# Patient Record
Sex: FEMALE | Race: BLACK OR AFRICAN AMERICAN | Employment: UNEMPLOYED | URBAN - METROPOLITAN AREA
[De-identification: names, ages, dates, MRNs, and addresses within clinical notes are randomized per-mention and may not be internally consistent; named-entity substitution may affect disease eponyms.]

---

## 2017-01-17 ENCOUNTER — TELEPHONE (OUTPATIENT)
Dept: FAMILY MEDICINE | Facility: CLINIC | Age: 32
End: 2017-01-17

## 2017-01-17 NOTE — TELEPHONE ENCOUNTER
[facility] phone call message- general phone call:    Reason for call: Patient was scheduled to see Dr. Goodman for intake appt in July 2016, but missed that appt. She is wanting to reschedule. Please call when available. Thank you!    Return call needed: Yes    OK to leave a message on voice mail? Yes    Primary language: English      needed? No    Call taken on January 17, 2017 at 12:38 PM by Bob Angeles

## 2017-01-18 NOTE — TELEPHONE ENCOUNTER
FREDDIEM for patient to call back and ask for Alicia or Cassi to schedule new appointment with Dr. Goodman.

## 2017-02-24 ENCOUNTER — OFFICE VISIT (OUTPATIENT)
Dept: URGENT CARE | Facility: URGENT CARE | Age: 32
End: 2017-02-24
Payer: COMMERCIAL

## 2017-02-24 ENCOUNTER — TELEPHONE (OUTPATIENT)
Dept: FAMILY MEDICINE | Facility: CLINIC | Age: 32
End: 2017-02-24

## 2017-02-24 VITALS
HEART RATE: 78 BPM | TEMPERATURE: 98.8 F | SYSTOLIC BLOOD PRESSURE: 118 MMHG | HEIGHT: 65 IN | WEIGHT: 201 LBS | OXYGEN SATURATION: 97 % | DIASTOLIC BLOOD PRESSURE: 80 MMHG | BODY MASS INDEX: 33.49 KG/M2

## 2017-02-24 DIAGNOSIS — H69.91 DYSFUNCTION OF EUSTACHIAN TUBE, RIGHT: ICD-10-CM

## 2017-02-24 DIAGNOSIS — H66.001 ACUTE SUPPURATIVE OTITIS MEDIA OF RIGHT EAR WITHOUT SPONTANEOUS RUPTURE OF TYMPANIC MEMBRANE, RECURRENCE NOT SPECIFIED: Primary | ICD-10-CM

## 2017-02-24 PROCEDURE — 99213 OFFICE O/P EST LOW 20 MIN: CPT | Performed by: PHYSICIAN ASSISTANT

## 2017-02-24 RX ORDER — GUAIFENESIN AND PSEUDOEPHEDRINE HCL 1200; 120 MG/1; MG/1
1 TABLET, EXTENDED RELEASE ORAL EVERY 12 HOURS PRN
Qty: 20 TABLET | Refills: 0 | Status: SHIPPED | OUTPATIENT
Start: 2017-02-24 | End: 2017-07-27

## 2017-02-24 RX ORDER — AMOXICILLIN 875 MG
875 TABLET ORAL 2 TIMES DAILY
Qty: 20 TABLET | Refills: 0 | Status: SHIPPED | OUTPATIENT
Start: 2017-02-24 | End: 2017-03-06

## 2017-02-24 NOTE — TELEPHONE ENCOUNTER
[facility] phone call message- patient requesting a refill:    Full Medication Name: buPROPion (WELLBUTRIN XL) 150 MG 24 hr tablet        Pharmacy confirmed as:    [address] [address]  Phone: [phone] Fax: [phone] Alternate Fax: [phone], [phone]  : Yes    Additional Comments: None     OK to leave a message on voice mail? No    Primary language: English      needed? No    Call taken on February 24, 2017 at 2:25 PM by Cassi Chavez

## 2017-02-24 NOTE — PROGRESS NOTES
"SUBJECTIVE:   Vincent Burris is a 31 year old female presenting with a chief complaint of right ear pain, nasal congestion.  Onset of symptoms was 2 day(s) ago.  Course of illness is worsening.    Severity moderate  Current and Associated symptoms: nasal congestion  Treatment measures tried include OTC meds.  Predisposing factors include recent illness.    Past Medical History   Diagnosis Date     Hidradenitis suppurativa         Allergies   Allergen Reactions     Zithromax [Azithromycin] Hives     hives         Social History   Substance Use Topics     Smoking status: Former Smoker     Packs/day: 0.10     Years: 8.00     Types: Cigarettes     Quit date: 1/1/2011     Smokeless tobacco: Never Used     Alcohol use 3.0 oz/week     5 Standard drinks or equivalent per week      Comment: Drinks 3-5 drinks 1-2 nights per weekend       ROS:  CONSTITUTIONAL:NEGATIVE for fever, chills, change in weight  INTEGUMENTARY/SKIN: NEGATIVE for worrisome rashes, moles or lesions  ENT/MOUTH: POSITIVE for right ear pain, nasal congestion  RESP:NEGATIVE for significant cough or SOB  CV: NEGATIVE for chest pain, palpitations or peripheral edema  MUSCULOSKELETAL: NEGATIVE for significant arthralgias or myalgia  NEURO: NEGATIVE for weakness, dizziness or paresthesias    OBJECTIVE  :/80  Pulse 78  Temp 98.8  F (37.1  C) (Oral)  Ht 5' 5\" (1.651 m)  Wt 201 lb (91.2 kg)  SpO2 97%  BMI 33.45 kg/m2  GENERAL APPEARANCE: healthy, alert and no distress  EYES: EOMI,  PERRL, conjunctiva clear  HENT: TM erythematous right  NECK: supple, nontender, no lymphadenopathy  RESP: lungs clear to auscultation - no rales, rhonchi or wheezes  CV: regular rates and rhythm, normal S1 S2, no murmur noted  NEURO: Normal strength and tone, sensory exam grossly normal,  normal speech and mentation  SKIN: no suspicious lesions or rashes    ASSESSMENT/PLAN:      ICD-10-CM    1. Acute suppurative otitis media of right ear without spontaneous rupture of tympanic " membrane, recurrence not specified H66.001 amoxicillin (AMOXIL) 875 MG tablet   2. Dysfunction of eustachian tube, right H69.81 PseudoePHEDrine-guaiFENesin (MUCINEX D) 120-1200 MG TB12       Follow up as needed

## 2017-02-24 NOTE — NURSING NOTE
"Chief Complaint   Patient presents with     Otalgia     right ear ach, body aches, runny nose 3x days        Initial /80  Pulse 78  Temp 98.8  F (37.1  C) (Oral)  Ht 5' 5\" (1.651 m)  Wt 201 lb (91.2 kg)  SpO2 97%  BMI 33.45 kg/m2 Estimated body mass index is 33.45 kg/(m^2) as calculated from the following:    Height as of this encounter: 5' 5\" (1.651 m).    Weight as of this encounter: 201 lb (91.2 kg).  Medication Reconciliation: complete    "

## 2017-02-24 NOTE — LETTER
[facility]  [address] [address]  [phone]      February 24, 2017    RE:  Vincent Burris                                                                                                                                                       [address] [place] [id number]            To whom it may concern:    Vincent Burris was seen in the urgent care today for an illness.     Sincerely,        Gilmar Wolf [facility]

## 2017-02-24 NOTE — MR AVS SNAPSHOT
After Visit Summary   2/24/2017    Vincent Burris    MRN: [id number]           Patient Information     Date Of Birth          1985        Visit Information        Provider Department      2/24/2017 9:30 AM Gilmar Wolf PA-C [facility]        Today's Diagnoses     Acute suppurative otitis media of right ear without spontaneous rupture of tympanic membrane, recurrence not specified    -  1    Dysfunction of eustachian tube, right           Follow-ups after your visit        Who to contact     If you have questions or need follow up information about today's clinic visit or your schedule please contact [facility] directly at [phone].  Normal or non-critical lab and imaging results will be communicated to you by CloudSafehart, letter or phone within 4 business days after the clinic has received the results. If you do not hear from us within 7 days, please contact the clinic through CloudSafehart or phone. If you have a critical or abnormal lab result, we will notify you by phone as soon as possible.  Submit refill requests through Ridley or call your pharmacy and they will forward the refill request to us. Please allow 3 business days for your refill to be completed.          Additional Information About Your Visit        MyChart Information     Ridley gives you secure access to your electronic health record. If you see a primary care provider, you can also send messages to your care team and make appointments. If you have questions, please call your primary care clinic.  If you do not have a primary care provider, please call [phone] and they will assist you.        Care EveryWhere ID     This is your Care EveryWhere ID. This could be used by other organizations to access your [place] medical records  [id number]        Your Vitals Were     Pulse Temperature Height Pulse Oximetry BMI (Body Mass Index)       78 98.8  F (37.1  C)  "(Oral) 5' 5\" (1.651 m) 97% 33.45 kg/m2        Blood Pressure from Last 3 Encounters:   02/24/17 118/80   07/20/16 123/81   06/15/16 139/85    Weight from Last 3 Encounters:   02/24/17 201 lb (91.2 kg)   07/20/16 186 lb (84.4 kg)   06/15/16 185 lb 12.8 oz (84.3 kg)              Today, you had the following     No orders found for display         Today's Medication Changes          These changes are accurate as of: 2/24/17  9:54 AM.  If you have any questions, ask your nurse or doctor.               Start taking these medicines.        Dose/Directions    amoxicillin 875 MG tablet   Commonly known as:  AMOXIL   Used for:  Acute suppurative otitis media of right ear without spontaneous rupture of tympanic membrane, recurrence not specified   Started by:  Gilmar Wolf PA-C        Dose:  875 mg   Take 1 tablet (875 mg) by mouth 2 times daily for 10 days   Quantity:  20 tablet   Refills:  0       PseudoePHEDrine-guaiFENesin 120-1200 MG Tb12   Commonly known as:  MUCINEX D   Used for:  Dysfunction of eustachian tube, right   Started by:  Gilmar Wolf PA-C        Dose:  1 tablet   Take 1 tablet by mouth every 12 hours as needed for congestion   Quantity:  20 tablet   Refills:  0            Where to get your medicines      These medications were sent to [address], [facility] [address]     Phone:  [phone]     amoxicillin 875 MG tablet         Some of these will need a paper prescription and others can be bought over the counter.  Ask your nurse if you have questions.     Bring a paper prescription for each of these medications     PseudoePHEDrine-guaiFENesin 120-1200 MG Tb12                Primary Care Provider Office Phone # Fax #    Xiomara Smith [phone]       [address]  [facility] [address]        Thank you!     Thank you for choosing [facility]" for your care. Our goal is always to provide you with excellent care. Hearing back from our patients is one way we can continue to improve our services. Please take a few minutes to complete the written survey that you may receive in the mail after your visit with us. Thank you!             Your Updated Medication List - Protect others around you: Learn how to safely use, store and throw away your medicines at www.disposemymeds.org.          This list is accurate as of: 2/24/17  9:54 AM.  Always use your most recent med list.                   Brand Name Dispense Instructions for use    amoxicillin 875 MG tablet    AMOXIL    20 tablet    Take 1 tablet (875 mg) by mouth 2 times daily for 10 days       buPROPion 150 MG 24 hr tablet    WELLBUTRIN XL    30 tablet    Take 1 tablet (150 mg) by mouth every morning       cephALEXin 500 MG capsule    KEFLEX    90 capsule    Take 1 capsule (500 mg) by mouth 3 times daily       cetirizine 10 MG tablet    zyrTEC    30 tablet    Take 1 tablet (10 mg) by mouth daily       clindamycin 1 % lotion    CLEOCIN T    60 mL    Daily to underarms       fluticasone 50 MCG/ACT spray    FLONASE    1 Bottle    Spray 1 spray into both nostrils daily       metroNIDAZOLE 0.75 % topical gel    METROGEL    45 g    Apply to both axilla once or twice daily as tolerated.       PseudoePHEDrine-guaiFENesin 120-1200 MG Tb12    MUCINEX D    20 tablet    Take 1 tablet by mouth every 12 hours as needed for congestion       sertraline 100 MG tablet    ZOLOFT    90 tablet    Take 1 tablet (100 mg) by mouth daily

## 2017-02-27 DIAGNOSIS — F41.1 GAD (GENERALIZED ANXIETY DISORDER): ICD-10-CM

## 2017-02-27 NOTE — TELEPHONE ENCOUNTER
Date of last visit at clinic: 07/20/2016    Please complete refill and CLOSE ENCOUNTER.  Closing the encounter signifies the refill is complete.

## 2017-03-01 NOTE — TELEPHONE ENCOUNTER
Patient returned call to schedule [facility] Intake. Patient would like to schedule with Dr. Goodman in April, currently schedule is not released that far. Informed patient I would add them to the recall list and we would call once April's schedule becomes available.

## 2017-03-03 RX ORDER — BUPROPION HYDROCHLORIDE 150 MG/1
150 TABLET ORAL EVERY MORNING
Qty: 30 TABLET | Refills: 3 | Status: SHIPPED | OUTPATIENT
Start: 2017-03-03 | End: 2017-08-28

## 2017-03-06 NOTE — TELEPHONE ENCOUNTER
Patient scheduled 04/10/17 at 4 pm with Dr. Goodman.    Cassi Chavez, [facility]

## 2017-04-10 ENCOUNTER — OFFICE VISIT (OUTPATIENT)
Dept: PSYCHOLOGY | Facility: CLINIC | Age: 32
End: 2017-04-10

## 2017-04-10 DIAGNOSIS — F41.1 GAD (GENERALIZED ANXIETY DISORDER): Primary | ICD-10-CM

## 2017-04-10 ASSESSMENT — PATIENT HEALTH QUESTIONNAIRE - PHQ9: 5. POOR APPETITE OR OVEREATING: SEVERAL DAYS

## 2017-04-10 ASSESSMENT — ANXIETY QUESTIONNAIRES
3. WORRYING TOO MUCH ABOUT DIFFERENT THINGS: SEVERAL DAYS
7. FEELING AFRAID AS IF SOMETHING AWFUL MIGHT HAPPEN: NOT AT ALL
5. BEING SO RESTLESS THAT IT IS HARD TO SIT STILL: NOT AT ALL
GAD7 TOTAL SCORE: 6
1. FEELING NERVOUS, ANXIOUS, OR ON EDGE: SEVERAL DAYS
2. NOT BEING ABLE TO STOP OR CONTROL WORRYING: SEVERAL DAYS
IF YOU CHECKED OFF ANY PROBLEMS ON THIS QUESTIONNAIRE, HOW DIFFICULT HAVE THESE PROBLEMS MADE IT FOR YOU TO DO YOUR WORK, TAKE CARE OF THINGS AT HOME, OR GET ALONG WITH OTHER PEOPLE: SOMEWHAT DIFFICULT
6. BECOMING EASILY ANNOYED OR IRRITABLE: MORE THAN HALF THE DAYS
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
## 2017-04-10 NOTE — MR AVS SNAPSHOT
After Visit Summary   4/10/2017    Vincent Burris    MRN: [id number]           Patient Information     Date Of Birth          1985        Visit Information        Provider Department      4/10/2017 4:00 PM Alessandra Goodman, PhD [facility]        Today's Diagnoses     SOLANGE (generalized anxiety disorder)    -  1       Follow-ups after your visit        Follow-up notes from your care team     Return in about 2 weeks (around 4/24/2017).      Your next 10 appointments already scheduled     May 02, 2017  8:20 AM CDT   Return Visit with Alessandra Goodman,    [facility] ([facility])    [address],  [address]  [place] [address]   [phone]              Who to contact     Please call your clinic at [phone] to:    Ask questions about your health    Make or cancel appointments    Discuss your medicines    Learn about your test results    Speak to your doctor   If you have compliments or concerns about an experience at your clinic, or if you wish to file a complaint, please contact [facility] Patient Relations at [phone] or email us at [email or web address]         Additional Information About Your Visit        MyChart Information     [employer] gives you secure access to your electronic health record. If you see a primary care provider, you can also send messages to your care team and make appointments. If you have questions, please call your primary care clinic.  If you do not have a primary care provider, please call [phone] and they will assist you.      [employer] is an electronic gateway that provides easy, online access to your medical records. With [employer], you can request a clinic appointment, read your test results, renew a prescription or communicate with your care team.     To access your existing account, please contact your [facility] or call  [phone] for assistance.        Care EveryWhere ID     This is your Care EveryWhere ID. This could be used by other organizations to access your [facility] medical records  [id number]         Blood Pressure from Last 3 Encounters:   02/24/17 118/80   07/20/16 123/81   06/15/16 139/85    Weight from Last 3 Encounters:   02/24/17 201 lb (91.2 kg)   07/20/16 186 lb (84.4 kg)   06/15/16 185 lb 12.8 oz (84.3 kg)              Today, you had the following     No orders found for display       Primary Care Provider Office Phone # Fax #    Xiomara Mariela Smith [phone]       [address]  [facility] [address]        Thank you!     Thank you for choosing [facility]  for your care. Our goal is always to provide you with excellent care. Hearing back from our patients is one way we can continue to improve our services. Please take a few minutes to complete the written survey that you may receive in the mail after your visit with us. Thank you!             Your Updated Medication List - Protect others around you: Learn how to safely use, store and throw away your medicines at www.disposemymeds.org.          This list is accurate as of: 4/10/17 11:59 PM.  Always use your most recent med list.                   Brand Name Dispense Instructions for use    buPROPion 150 MG 24 hr tablet    WELLBUTRIN XL    30 tablet    Take 1 tablet (150 mg) by mouth every morning       cephALEXin 500 MG capsule    KEFLEX    90 capsule    Take 1 capsule (500 mg) by mouth 3 times daily       cetirizine 10 MG tablet    zyrTEC    30 tablet    Take 1 tablet (10 mg) by mouth daily       clindamycin 1 % lotion    CLEOCIN T    60 mL    Daily to underarms       fluticasone 50 MCG/ACT spray    FLONASE    1 Bottle    Spray 1 spray into both nostrils daily       metroNIDAZOLE 0.75 % topical gel    METROGEL    45 g    Apply to both axilla once or twice daily as tolerated.        PseudoePHEDrine-guaiFENesin 120-1200 MG Tb12    MUCINEX D    20 tablet    Take 1 tablet by mouth every 12 hours as needed for congestion       sertraline 100 MG tablet    ZOLOFT    90 tablet    Take 1 tablet (100 mg) by mouth daily

## 2017-04-11 ASSESSMENT — ANXIETY QUESTIONNAIRES: GAD7 TOTAL SCORE: 6

## 2017-04-11 ASSESSMENT — PATIENT HEALTH QUESTIONNAIRE - PHQ9: SUM OF ALL RESPONSES TO PHQ QUESTIONS 1-9: 6

## 2017-04-12 NOTE — PROGRESS NOTES
Preceptor Attestation:  I have reviewed and agree with the behavioral health fellow's documentation for this visit.  I did not see the patient.  Supervising Clinical Psychologist:  Delisa Encinas PSYD LP

## 2017-06-19 DIAGNOSIS — F41.1 GAD (GENERALIZED ANXIETY DISORDER): ICD-10-CM

## 2017-06-19 RX ORDER — SERTRALINE HYDROCHLORIDE 100 MG/1
100 TABLET, FILM COATED ORAL DAILY
Qty: 90 TABLET | Refills: 3 | Status: SHIPPED | OUTPATIENT
Start: 2017-06-19

## 2017-06-19 NOTE — TELEPHONE ENCOUNTER
Request for medication refill:    Date of last visit at clinic: 7/20/16    Please complete refill if appropriate and CLOSE ENCOUNTER.    Closing the encounter signifies the refill is complete.    If refill has been denied, please complete the smart phrase .smirefuse and route it to the [facility] RN TRIAGE pool to inform the patient and the pharmacy.    Pam Romero

## 2017-06-30 ENCOUNTER — OFFICE VISIT (OUTPATIENT)
Dept: URGENT CARE | Facility: URGENT CARE | Age: 32
End: 2017-06-30
Payer: COMMERCIAL

## 2017-06-30 VITALS
TEMPERATURE: 98.3 F | SYSTOLIC BLOOD PRESSURE: 116 MMHG | HEART RATE: 72 BPM | DIASTOLIC BLOOD PRESSURE: 78 MMHG | BODY MASS INDEX: 31.28 KG/M2 | OXYGEN SATURATION: 99 % | WEIGHT: 188 LBS

## 2017-06-30 DIAGNOSIS — K64.4 EXTERNAL HEMORRHOIDS: Primary | ICD-10-CM

## 2017-06-30 PROCEDURE — 99213 OFFICE O/P EST LOW 20 MIN: CPT | Performed by: FAMILY MEDICINE

## 2017-06-30 NOTE — PROGRESS NOTES
SUBJECTIVE:   Vincent Burris is a 31 year old female presenting with a chief complaint of a possible genital herpes outbreak.  She has noticed a little itching, discomfort and a spongy mass around her anus.  No blood with defecation.  No pain with defecation.  She was constipated prior to these coming up this week, and has taken a laxative.   She has had herpes outbreaks twice in the past.   Both times stinging/painful rash to groin.  None of these symptoms this time.  No fevers.      ROS:  5 point review of symptoms negative other than positives stated above.    OBJECTIVE  /78  Pulse 72  Temp 98.3  F (36.8  C) (Oral)  Wt 188 lb (85.3 kg)  SpO2 99%  BMI 31.28 kg/m2  GENERAL:  Awake, alert and interactive. No acute distress.  :  Anus - 2 external hemorrhoids noted, no bleeding.  Neither is thrombosed.  Minimally TTP.   External genitalia otherwise normal in appearance, no rashes or lesions.    ASSESSMENT/PLAN    ICD-10-CM    1. External hemorrhoids K64.4      We discussed the expected course and symptomatic cares in detail, including return to care if symptoms not improving as expected, do not resolve completely, or if any new or worsening symptoms develop.    Patient Instructions     Preparation H or similar product.   Consider a stool softener.          Hemorrhoids    Hemorrhoids are swollen and inflamed veins inside the rectum and near the anus. The rectum is the last several inches of the colon. The anus is the passage between the rectum and the outside of the body.  Causes  The veins can become swollen due to increased pressure in them. This is most often caused by:    Chronic constipation or diarrhea    Straining when having a bowel movement    Sitting too long on the toilet    A low-fiber diet    Pregnancy  Symptoms    Bleeding from the rectum (this may be noticeable after bowel movements)    Lump near the anus    Itching around the anus    Pain around the anus  There are different types of  hemorrhoids. Depending on the type you have and the severity, you may be able to treat yourself at home. In some cases, a procedure may be the best treatment option. Your healthcare provider can tell you more about this, if needed.  Home care  General care    To get relief from pain or itching, try:    Topical products. Your healthcare provider may prescribe or recommend creams, ointments, or pads that can be applied to the hemorrhoid. Use these exactly as directed.    Medicines. Your healthcare provider may recommend stool softeners, suppositories, or laxatives to help manage constipation. Use these exactly as directed.    Sitz baths. A sitz bath involves sitting in a few inches of warm bath water. Be careful not to make the water so hot that you burn yourself--test it before sitting in it. Soak for about 10 to 15 minutes a few times a day. This may help relieve pain.  Tips to help prevent hemorrhoids    Eat more fiber. Fiber adds bulk to stool and absorbs water as it moves through your colon. This makes stool softer and easier to pass.    Increase the fiber in your diet with more fiber-rich foods. These include fresh fruit, vegetables, and whole grains.    Take a fiber supplement or bulking agent, if advised to by your provider. These include products such as psyllium or methylcellulose.    Drink plenty of water, if directed to by your provider. This can help keep stool soft.    Be more active. Frequent exercise aids digestion and helps prevent constipation. It may also help make bowel movements more regular.    Don t strain during bowel movements. This can make hemorrhoids more likely. Also, don t sit on the toilet for long periods of time.  Follow-up care  Follow up with your healthcare provider, or as advised. If a culture or imaging tests were done, you will be notified of the results when they are ready. This may take a few days or longer.  When to seek medical advice  Call your healthcare provider right away  if any of these occur:    Increased bleeding from the rectum    Increased pain around the rectum or anus    Weakness or dizziness  Call 911  Call 911 or return to the emergency department right away if any of these occur:    Trouble breathing or swallowing    Fainting or loss of consciousness    Unusually fast heart rate    Vomiting blood    Large amounts of blood in stool  Date Last Reviewed: 6/22/2015 2000-2017 [employer]. [address] [place] [address], [place] [id number]. All rights reserved. This information is not intended as a substitute for professional medical care. Always follow your healthcare professional's instructions.        Treating Hemorrhoids: Self-Care    Follow your healthcare provider s advice about caring for your hemorrhoids at home. Some treatments help relieve symptoms right away. Others involve making changes in your diet and exercise habits. These can help ease constipation and prevent hemorrhoid symptoms from coming back.  Relieving symptoms  Your healthcare provider may prescribe anti-inflammatory medicine to help ease your symptoms. The following tips will also help relieve pain and swelling.    Take sitz baths. Taking a sitz bath means sitting in a few inches of warm bath water. Soaking for 10 minutes twice a day can provide welcome relief from painful hemorrhoids. It can also help the area stay clean.    Develop good bowel habits. Use the bathroom when you need to. Don t ignore the urge to move your bowels. This can lead to constipation, hard stools, and straining. Also, don t read while on the toilet. Sit only as long as needed. Wipe gently with soft, unscented toilet tissue or baby wipes.    Use ice packs. Placing an ice pack on a thrombosed external hemorrhoid can help relieve pain right away. It will also help reduce the blood clot. Use the ice for 15 to 20 minutes at a time. Keep a cloth between the ice and your skin to prevent skin damage.    Use other  measures. Laxatives and enemas can help ease constipation. But use them only on your healthcare provider s advice. For symptom relief, try using cotton pads soaked in witch hazel. These are available at most drugstores. Over-the-counter hemorrhoid ointments and petroleum jelly can also provide relief.  Add fiber to your diet  Adding fiber to your diet can help relieve constipation by making stools softer and easier to pass. To increase your fiber intake, your healthcare provider may recommend a bulking agent, such as psyllium. This is a high-fiber supplement available at most grocery stores and drugstores. Eating more fiber-rich foods will also help. There are two types of fiber:    Insoluble fiber is the main ingredient in bulking agents. It s also found in foods such as wheat bran, whole-grain breads, fresh fruits, and vegetables.    Soluble fiber is found in foods such as oat bran. Although soluble fiber is good for you, it may not ease constipation as much as foods high in insoluble fiber.  Drink more water  Along with a high-fiber diet, drinking more water can help ease constipation. This is because insoluble fiber absorbs water, making stools soft and bulky. Be sure to drink plenty of water throughout the day. Drinking fruit juices, such as prune juice or apple juice, can also help prevent constipation.  Get more exercise  Regular exercise aids digestion and helps prevent constipation. It s also great for your health. So talk with your healthcare provider about starting an exercise program. Low-impact activities, such as swimming or walking, are good places to start. Take it easy at first. And remember to drink plenty of water when you exercise.  High-fiber foods  High-fiber foods offer many benefits. By making your stools softer, they help heal and prevent swollen hemorrhoids. They may also help reduce the risk of colon and rectal cancer. Best of all, they re usually low in calories and taste great. Here are  some examples of fiber-rich foods.    Whole grains, such as wheat bran, corn bran, and brown rice.    Vegetables, especially carrots, broccoli, cabbage, and peas.    Fruits, such as apples, bananas, raisins, peaches, and pears.    Nuts and legumes, especially peanuts, lentils, and kidney beans.  Easy ways to add fiber  The tips below offer some simple ways to add more high-fiber foods to your meals.    Start your day with a high-fiber breakfast. Eat a wheat bran cereal along with a sliced banana. Or, try peanut butter on whole-wheat toast.    Eat carrot sticks for snacks. They re easy to prepare, taste great, and are low in calories.    Use whole-grain breads instead of white bread for sandwiches.    Eat fruits for treats. Try an apple and some raisins instead of a candy bar.   Date Last Reviewed: 7/1/2016 2000-2017 [employer]. [address] [place] [address]. All rights reserved. This information is not intended as a substitute for professional medical care. Always follow your healthcare professional's instructions.        Understanding Hemorrhoids    Hemorrhoid tissues are  cushions  of blood vessels that swell slightly during bowel movements. Too much pressure on the anal canal can make these tissues remain enlarged, become inflamed, and cause symptoms. This can happen both inside and outside the anal canal.  Parts of the anal canal  The parts of the anal canal are:    Internal hemorrhoid tissue is in the upper area of the anal canal.    The rectum is the last several inches of the colon. This is where stool is stored prior to bowel movements.    Anal sphincters are ring-shaped muscles that expand and contract to control the anal opening.    External hemorrhoid tissue lies under the anal skin.    The anus is the passage between the rectum and the outside of the body.  Normal hemorrhoid tissue  Hemorrhoid tissues play an important role in helping your body eliminate waste. Food passes  from the stomach through the intestines. The waste (stool) then travels through the colon to the rectum. It is stored in the rectum until it s ready to be passed from the anus. During bowel movements, hemorrhoids swell with blood and become slightly larger. This swelling helps protect and cushion the anal canal as stool passes from the body. Once the stool has passed, the tissues stop swelling and return to normal.  Problem hemorrhoids  Pressure due to straining or other factors can cause hemorrhoid tissues to remain swollen. When this happens to the hemorrhoid tissues in the anal canal they re called internal hemorrhoids. Swollen tissues around the anal opening are called external hemorrhoids. Depending on the location, your symptoms can differ.    Internal hemorrhoids often happen in clusters around the wall of the anal canal. They are usually painless. But they may prolapse (protrude out of the anus) due to straining or pressure from hard stool. After the bowel movement is over, they may then reduce (return inside the body). Internal hemorrhoids often bleed. They can also discharge mucus.      External hemorrhoids are located at the anal opening, just beneath the skin. These tissues rarely cause problems unless they thrombose (form a blood clot). When this happens, a hard, bluish lump may appear. A thrombosed hemorrhoid also causes sudden, severe pain. In time, the clot may go away on its own. This sometimes leaves a  skin tag  of tissue stretched by the clot.  Hemorrhoid symptoms  Hemorrhoid symptoms may include:    Pain or a burning sensation    Bleeding during bowel movements    Protrusion of tissue from the anus    Itching around the anus  Causes of hemorrhoids  There s no single cause of hemorrhoids. Most often, though, they are caused by too much pressure on the anal canal. This can be due to:    Chronic (ongoing) constipation    Straining during bowel movements    Sitting too long on the  toilet    Strenuous exercise or heavy lifting    Pregnancy and childbirth    Aging    Diarrhea  Date Last Reviewed: 7/1/2016 2000-2017 [employer]. [address] [place] [address], [place] [address]. All rights reserved. This information is not intended as a substitute for professional medical care. Always follow your healthcare professional's instructions.

## 2017-06-30 NOTE — MR AVS SNAPSHOT
After Visit Summary   6/30/2017    Vincent Burris    MRN: [id number]           Patient Information     Date Of Birth          1985        Visit Information        Provider Department      6/30/2017 6:15 PM Delisa Rios DO [facility]        Today's Diagnoses     External hemorrhoids    -  1      Care Instructions    Preparation H or similar product.   Consider a stool softener.          Hemorrhoids    Hemorrhoids are swollen and inflamed veins inside the rectum and near the anus. The rectum is the last several inches of the colon. The anus is the passage between the rectum and the outside of the body.  Causes  The veins can become swollen due to increased pressure in them. This is most often caused by:    Chronic constipation or diarrhea    Straining when having a bowel movement    Sitting too long on the toilet    A low-fiber diet    Pregnancy  Symptoms    Bleeding from the rectum (this may be noticeable after bowel movements)    Lump near the anus    Itching around the anus    Pain around the anus  There are different types of hemorrhoids. Depending on the type you have and the severity, you may be able to treat yourself at home. In some cases, a procedure may be the best treatment option. Your healthcare provider can tell you more about this, if needed.  Home care  General care    To get relief from pain or itching, try:    Topical products. Your healthcare provider may prescribe or recommend creams, ointments, or pads that can be applied to the hemorrhoid. Use these exactly as directed.    Medicines. Your healthcare provider may recommend stool softeners, suppositories, or laxatives to help manage constipation. Use these exactly as directed.    Sitz baths. A sitz bath involves sitting in a few inches of warm bath water. Be careful not to make the water so hot that you burn yourself--test it before sitting in it. Soak for about 10 to 15 minutes a few times a  day. This may help relieve pain.  Tips to help prevent hemorrhoids    Eat more fiber. Fiber adds bulk to stool and absorbs water as it moves through your colon. This makes stool softer and easier to pass.    Increase the fiber in your diet with more fiber-rich foods. These include fresh fruit, vegetables, and whole grains.    Take a fiber supplement or bulking agent, if advised to by your provider. These include products such as psyllium or methylcellulose.    Drink plenty of water, if directed to by your provider. This can help keep stool soft.    Be more active. Frequent exercise aids digestion and helps prevent constipation. It may also help make bowel movements more regular.    Don t strain during bowel movements. This can make hemorrhoids more likely. Also, don t sit on the toilet for long periods of time.  Follow-up care  Follow up with your healthcare provider, or as advised. If a culture or imaging tests were done, you will be notified of the results when they are ready. This may take a few days or longer.  When to seek medical advice  Call your healthcare provider right away if any of these occur:    Increased bleeding from the rectum    Increased pain around the rectum or anus    Weakness or dizziness  Call 911  Call 911 or return to the emergency department right away if any of these occur:    Trouble breathing or swallowing    Fainting or loss of consciousness    Unusually fast heart rate    Vomiting blood    Large amounts of blood in stool  Date Last Reviewed: 6/22/2015 2000-2017 [employer]. [address] [place] [address], [place] [address]. All rights reserved. This information is not intended as a substitute for professional medical care. Always follow your healthcare professional's instructions.        Treating Hemorrhoids: Self-Care    Follow your healthcare provider s advice about caring for your hemorrhoids at home. Some treatments help relieve symptoms right away. Others involve making  changes in your diet and exercise habits. These can help ease constipation and prevent hemorrhoid symptoms from coming back.  Relieving symptoms  Your healthcare provider may prescribe anti-inflammatory medicine to help ease your symptoms. The following tips will also help relieve pain and swelling.    Take sitz baths. Taking a sitz bath means sitting in a few inches of warm bath water. Soaking for 10 minutes twice a day can provide welcome relief from painful hemorrhoids. It can also help the area stay clean.    Develop good bowel habits. Use the bathroom when you need to. Don t ignore the urge to move your bowels. This can lead to constipation, hard stools, and straining. Also, don t read while on the toilet. Sit only as long as needed. Wipe gently with soft, unscented toilet tissue or baby wipes.    Use ice packs. Placing an ice pack on a thrombosed external hemorrhoid can help relieve pain right away. It will also help reduce the blood clot. Use the ice for 15 to 20 minutes at a time. Keep a cloth between the ice and your skin to prevent skin damage.    Use other measures. Laxatives and enemas can help ease constipation. But use them only on your healthcare provider s advice. For symptom relief, try using cotton pads soaked in witch hazel. These are available at most drugstores. Over-the-counter hemorrhoid ointments and petroleum jelly can also provide relief.  Add fiber to your diet  Adding fiber to your diet can help relieve constipation by making stools softer and easier to pass. To increase your fiber intake, your healthcare provider may recommend a bulking agent, such as psyllium. This is a high-fiber supplement available at most grocery stores and drugstores. Eating more fiber-rich foods will also help. There are two types of fiber:    Insoluble fiber is the main ingredient in bulking agents. It s also found in foods such as wheat bran, whole-grain breads, fresh fruits, and vegetables.    Soluble fiber is  found in foods such as oat bran. Although soluble fiber is good for you, it may not ease constipation as much as foods high in insoluble fiber.  Drink more water  Along with a high-fiber diet, drinking more water can help ease constipation. This is because insoluble fiber absorbs water, making stools soft and bulky. Be sure to drink plenty of water throughout the day. Drinking fruit juices, such as prune juice or apple juice, can also help prevent constipation.  Get more exercise  Regular exercise aids digestion and helps prevent constipation. It s also great for your health. So talk with your healthcare provider about starting an exercise program. Low-impact activities, such as swimming or walking, are good places to start. Take it easy at first. And remember to drink plenty of water when you exercise.  High-fiber foods  High-fiber foods offer many benefits. By making your stools softer, they help heal and prevent swollen hemorrhoids. They may also help reduce the risk of colon and rectal cancer. Best of all, they re usually low in calories and taste great. Here are some examples of fiber-rich foods.    Whole grains, such as wheat bran, corn bran, and brown rice.    Vegetables, especially carrots, broccoli, cabbage, and peas.    Fruits, such as apples, bananas, raisins, peaches, and pears.    Nuts and legumes, especially peanuts, lentils, and kidney beans.  Easy ways to add fiber  The tips below offer some simple ways to add more high-fiber foods to your meals.    Start your day with a high-fiber breakfast. Eat a wheat bran cereal along with a sliced banana. Or, try peanut butter on whole-wheat toast.    Eat carrot sticks for snacks. They re easy to prepare, taste great, and are low in calories.    Use whole-grain breads instead of white bread for sandwiches.    Eat fruits for treats. Try an apple and some raisins instead of a candy bar.   Date Last Reviewed: 7/1/2016 2000-2017 [employer] [address]  [place] [address]. All rights reserved. This information is not intended as a substitute for professional medical care. Always follow your healthcare professional's instructions.        Understanding Hemorrhoids    Hemorrhoid tissues are  cushions  of blood vessels that swell slightly during bowel movements. Too much pressure on the anal canal can make these tissues remain enlarged, become inflamed, and cause symptoms. This can happen both inside and outside the anal canal.  Parts of the anal canal  The parts of the anal canal are:    Internal hemorrhoid tissue is in the upper area of the anal canal.    The rectum is the last several inches of the colon. This is where stool is stored prior to bowel movements.    Anal sphincters are ring-shaped muscles that expand and contract to control the anal opening.    External hemorrhoid tissue lies under the anal skin.    The anus is the passage between the rectum and the outside of the body.  Normal hemorrhoid tissue  Hemorrhoid tissues play an important role in helping your body eliminate waste. Food passes from the stomach through the intestines. The waste (stool) then travels through the colon to the rectum. It is stored in the rectum until it s ready to be passed from the anus. During bowel movements, hemorrhoids swell with blood and become slightly larger. This swelling helps protect and cushion the anal canal as stool passes from the body. Once the stool has passed, the tissues stop swelling and return to normal.  Problem hemorrhoids  Pressure due to straining or other factors can cause hemorrhoid tissues to remain swollen. When this happens to the hemorrhoid tissues in the anal canal they re called internal hemorrhoids. Swollen tissues around the anal opening are called external hemorrhoids. Depending on the location, your symptoms can differ.    Internal hemorrhoids often happen in clusters around the wall of the anal canal. They are usually  painless. But they may prolapse (protrude out of the anus) due to straining or pressure from hard stool. After the bowel movement is over, they may then reduce (return inside the body). Internal hemorrhoids often bleed. They can also discharge mucus.      External hemorrhoids are located at the anal opening, just beneath the skin. These tissues rarely cause problems unless they thrombose (form a blood clot). When this happens, a hard, bluish lump may appear. A thrombosed hemorrhoid also causes sudden, severe pain. In time, the clot may go away on its own. This sometimes leaves a  skin tag  of tissue stretched by the clot.  Hemorrhoid symptoms  Hemorrhoid symptoms may include:    Pain or a burning sensation    Bleeding during bowel movements    Protrusion of tissue from the anus    Itching around the anus  Causes of hemorrhoids  There s no single cause of hemorrhoids. Most often, though, they are caused by too much pressure on the anal canal. This can be due to:    Chronic (ongoing) constipation    Straining during bowel movements    Sitting too long on the toilet    Strenuous exercise or heavy lifting    Pregnancy and childbirth    Aging    Diarrhea  Date Last Reviewed: 7/1/2016 2000-2017 "[employer]. [address] [place] [address]. All rights reserved. This information is not intended as a substitute for professional medical care. Always follow your healthcare professional's instructions.                Follow-ups after your visit        Who to contact     If you have questions or need follow up information about today's clinic visit or your schedule please contact [facility] directly at [phone].  Normal or non-critical lab and imaging results will be communicated to you by MyChart, letter or phone within 4 business days after the clinic has received the results. If you do not hear from us within 7 days, please contact the clinic through MyChart or phone. If  you have a critical or abnormal lab result, we will notify you by phone as soon as possible.  Submit refill requests through [employer] or call your pharmacy and they will forward the refill request to us. Please allow 3 business days for your refill to be completed.          Additional Information About Your Visit        [employer] Information     [employer] gives you secure access to your electronic health record. If you see a primary care provider, you can also send messages to your care team and make appointments. If you have questions, please call your primary care clinic.  If you do not have a primary care provider, please call [phone] and they will assist you.        Care EveryWhere ID     This is your Care EveryWhere ID. This could be used by other organizations to access your [facility] medical records  [id number]        Your Vitals Were     Pulse Temperature Pulse Oximetry BMI (Body Mass Index)          72 98.3  F (36.8  C) (Oral) 99% 31.28 kg/m2         Blood Pressure from Last 3 Encounters:   06/30/17 116/78   02/24/17 118/80   07/20/16 123/81    Weight from Last 3 Encounters:   06/30/17 188 lb (85.3 kg)   02/24/17 201 lb (91.2 kg)   07/20/16 186 lb (84.4 kg)              Today, you had the following     No orders found for display       Primary Care Provider Office Phone # Fax #    Xiomara Mariela Smith [phone]       [facility] [address] [facility] [address]  [facility] [address]        Equal Access to Services     JASVIR HOLLOWAY : Hadii aad ku hadasho Soomaali, waaxda luqadaha, qaybta kaalmada adeegyada, waxay ramiro adkins. So [facility] [phone].    ATENCIÓN: Si habla español, tiene a alston disposición servicios gratuitos de asistencia lingüística. Llame al [phone].    We comply with applicable federal civil rights laws and [place] laws. We do not discriminate on the basis of race, color, national origin, age, disability sex, sexual orientation or gender  identity.            Thank you!     Thank you for choosing [facility]  for your care. Our goal is always to provide you with excellent care. Hearing back from our patients is one way we can continue to improve our services. Please take a few minutes to complete the written survey that you may receive in the mail after your visit with us. Thank you!             Your Updated Medication List - Protect others around you: Learn how to safely use, store and throw away your medicines at www.disposemymeds.org.          This list is accurate as of: 6/30/17  6:49 PM.  Always use your most recent med list.                   Brand Name Dispense Instructions for use Diagnosis    buPROPion 150 MG 24 hr tablet    WELLBUTRIN XL    30 tablet    Take 1 tablet (150 mg) by mouth every morning    SOLANGE (generalized anxiety disorder)       cephALEXin 500 MG capsule    KEFLEX    90 capsule    Take 1 capsule (500 mg) by mouth 3 times daily    Hidradenitis suppurativa of right axilla       cetirizine 10 MG tablet    zyrTEC    30 tablet    Take 1 tablet (10 mg) by mouth daily    Seasonal allergic rhinitis       clindamycin 1 % lotion    CLEOCIN T    60 mL    Daily to underarms    Hidradenitis suppurativa       fluticasone 50 MCG/ACT spray    FLONASE    1 Bottle    Spray 1 spray into both nostrils daily    Seasonal allergic rhinitis       metroNIDAZOLE 0.75 % topical gel    METROGEL    45 g    Apply to both axilla once or twice daily as tolerated.    Hidradenitis suppurativa       PseudoePHEDrine-guaiFENesin 120-1200 MG Tb12    MUCINEX D    20 tablet    Take 1 tablet by mouth every 12 hours as needed for congestion    Dysfunction of eustachian tube, right       sertraline 100 MG tablet    ZOLOFT    90 tablet    Take 1 tablet (100 mg) by mouth daily    SOLANGE (generalized anxiety disorder)

## 2017-06-30 NOTE — PATIENT INSTRUCTIONS
Preparation H or similar product.   Consider a stool softener.          Hemorrhoids    Hemorrhoids are swollen and inflamed veins inside the rectum and near the anus. The rectum is the last several inches of the colon. The anus is the passage between the rectum and the outside of the body.  Causes  The veins can become swollen due to increased pressure in them. This is most often caused by:    Chronic constipation or diarrhea    Straining when having a bowel movement    Sitting too long on the toilet    A low-fiber diet    Pregnancy  Symptoms    Bleeding from the rectum (this may be noticeable after bowel movements)    Lump near the anus    Itching around the anus    Pain around the anus  There are different types of hemorrhoids. Depending on the type you have and the severity, you may be able to treat yourself at home. In some cases, a procedure may be the best treatment option. Your healthcare provider can tell you more about this, if needed.  Home care  General care    To get relief from pain or itching, try:    Topical products. Your healthcare provider may prescribe or recommend creams, ointments, or pads that can be applied to the hemorrhoid. Use these exactly as directed.    Medicines. Your healthcare provider may recommend stool softeners, suppositories, or laxatives to help manage constipation. Use these exactly as directed.    Sitz baths. A sitz bath involves sitting in a few inches of warm bath water. Be careful not to make the water so hot that you burn yourself--test it before sitting in it. Soak for about 10 to 15 minutes a few times a day. This may help relieve pain.  Tips to help prevent hemorrhoids    Eat more fiber. Fiber adds bulk to stool and absorbs water as it moves through your colon. This makes stool softer and easier to pass.    Increase the fiber in your diet with more fiber-rich foods. These include fresh fruit, vegetables, and whole grains.    Take a fiber supplement or bulking agent, if  advised to by your provider. These include products such as psyllium or methylcellulose.    Drink plenty of water, if directed to by your provider. This can help keep stool soft.    Be more active. Frequent exercise aids digestion and helps prevent constipation. It may also help make bowel movements more regular.    Don t strain during bowel movements. This can make hemorrhoids more likely. Also, don t sit on the toilet for long periods of time.  Follow-up care  Follow up with your healthcare provider, or as advised. If a culture or imaging tests were done, you will be notified of the results when they are ready. This may take a few days or longer.  When to seek medical advice  Call your healthcare provider right away if any of these occur:    Increased bleeding from the rectum    Increased pain around the rectum or anus    Weakness or dizziness  Call 911  Call 911 or return to the emergency department right away if any of these occur:    Trouble breathing or swallowing    Fainting or loss of consciousness    Unusually fast heart rate    Vomiting blood    Large amounts of blood in stool  Date Last Reviewed: 6/22/2015 2000-2017 [employer]. [address] [place], [place] [id number]. All rights reserved. This information is not intended as a substitute for professional medical care. Always follow your healthcare professional's instructions.        Treating Hemorrhoids: Self-Care    Follow your healthcare provider s advice about caring for your hemorrhoids at home. Some treatments help relieve symptoms right away. Others involve making changes in your diet and exercise habits. These can help ease constipation and prevent hemorrhoid symptoms from coming back.  Relieving symptoms  Your healthcare provider may prescribe anti-inflammatory medicine to help ease your symptoms. The following tips will also help relieve pain and swelling.    Take sitz baths. Taking a sitz bath means sitting in a few inches of  warm bath water. Soaking for 10 minutes twice a day can provide welcome relief from painful hemorrhoids. It can also help the area stay clean.    Develop good bowel habits. Use the bathroom when you need to. Don t ignore the urge to move your bowels. This can lead to constipation, hard stools, and straining. Also, don t read while on the toilet. Sit only as long as needed. Wipe gently with soft, unscented toilet tissue or baby wipes.    Use ice packs. Placing an ice pack on a thrombosed external hemorrhoid can help relieve pain right away. It will also help reduce the blood clot. Use the ice for 15 to 20 minutes at a time. Keep a cloth between the ice and your skin to prevent skin damage.    Use other measures. Laxatives and enemas can help ease constipation. But use them only on your healthcare provider s advice. For symptom relief, try using cotton pads soaked in witch hazel. These are available at most drugstores. Over-the-counter hemorrhoid ointments and petroleum jelly can also provide relief.  Add fiber to your diet  Adding fiber to your diet can help relieve constipation by making stools softer and easier to pass. To increase your fiber intake, your healthcare provider may recommend a bulking agent, such as psyllium. This is a high-fiber supplement available at most grocery stores and drugstores. Eating more fiber-rich foods will also help. There are two types of fiber:    Insoluble fiber is the main ingredient in bulking agents. It s also found in foods such as wheat bran, whole-grain breads, fresh fruits, and vegetables.    Soluble fiber is found in foods such as oat bran. Although soluble fiber is good for you, it may not ease constipation as much as foods high in insoluble fiber.  Drink more water  Along with a high-fiber diet, drinking more water can help ease constipation. This is because insoluble fiber absorbs water, making stools soft and bulky. Be sure to drink plenty of water throughout the day.  Drinking fruit juices, such as prune juice or apple juice, can also help prevent constipation.  Get more exercise  Regular exercise aids digestion and helps prevent constipation. It s also great for your health. So talk with your healthcare provider about starting an exercise program. Low-impact activities, such as swimming or walking, are good places to start. Take it easy at first. And remember to drink plenty of water when you exercise.  High-fiber foods  High-fiber foods offer many benefits. By making your stools softer, they help heal and prevent swollen hemorrhoids. They may also help reduce the risk of colon and rectal cancer. Best of all, they re usually low in calories and taste great. Here are some examples of fiber-rich foods.    Whole grains, such as wheat bran, corn bran, and brown rice.    Vegetables, especially carrots, broccoli, cabbage, and peas.    Fruits, such as apples, bananas, raisins, peaches, and pears.    Nuts and legumes, especially peanuts, lentils, and kidney beans.  Easy ways to add fiber  The tips below offer some simple ways to add more high-fiber foods to your meals.    Start your day with a high-fiber breakfast. Eat a wheat bran cereal along with a sliced banana. Or, try peanut butter on whole-wheat toast.    Eat carrot sticks for snacks. They re easy to prepare, taste great, and are low in calories.    Use whole-grain breads instead of white bread for sandwiches.    Eat fruits for treats. Try an apple and some raisins instead of a candy bar.   Date Last Reviewed: 7/1/2016 2000-2017 [employer]. [address] [place] [address], [place] [address]. All rights reserved. This information is not intended as a substitute for professional medical care. Always follow your healthcare professional's instructions.        Understanding Hemorrhoids    Hemorrhoid tissues are  cushions  of blood vessels that swell slightly during bowel movements. Too much pressure on the anal canal can make  these tissues remain enlarged, become inflamed, and cause symptoms. This can happen both inside and outside the anal canal.  Parts of the anal canal  The parts of the anal canal are:    Internal hemorrhoid tissue is in the upper area of the anal canal.    The rectum is the last several inches of the colon. This is where stool is stored prior to bowel movements.    Anal sphincters are ring-shaped muscles that expand and contract to control the anal opening.    External hemorrhoid tissue lies under the anal skin.    The anus is the passage between the rectum and the outside of the body.  Normal hemorrhoid tissue  Hemorrhoid tissues play an important role in helping your body eliminate waste. Food passes from the stomach through the intestines. The waste (stool) then travels through the colon to the rectum. It is stored in the rectum until it s ready to be passed from the anus. During bowel movements, hemorrhoids swell with blood and become slightly larger. This swelling helps protect and cushion the anal canal as stool passes from the body. Once the stool has passed, the tissues stop swelling and return to normal.  Problem hemorrhoids  Pressure due to straining or other factors can cause hemorrhoid tissues to remain swollen. When this happens to the hemorrhoid tissues in the anal canal they re called internal hemorrhoids. Swollen tissues around the anal opening are called external hemorrhoids. Depending on the location, your symptoms can differ.    Internal hemorrhoids often happen in clusters around the wall of the anal canal. They are usually painless. But they may prolapse (protrude out of the anus) due to straining or pressure from hard stool. After the bowel movement is over, they may then reduce (return inside the body). Internal hemorrhoids often bleed. They can also discharge mucus.      External hemorrhoids are located at the anal opening, just beneath the skin. These tissues rarely cause problems unless they  thrombose (form a blood clot). When this happens, a hard, bluish lump may appear. A thrombosed hemorrhoid also causes sudden, severe pain. In time, the clot may go away on its own. This sometimes leaves a  skin tag  of tissue stretched by the clot.  Hemorrhoid symptoms  Hemorrhoid symptoms may include:    Pain or a burning sensation    Bleeding during bowel movements    Protrusion of tissue from the anus    Itching around the anus  Causes of hemorrhoids  There s no single cause of hemorrhoids. Most often, though, they are caused by too much pressure on the anal canal. This can be due to:    Chronic (ongoing) constipation    Straining during bowel movements    Sitting too long on the toilet    Strenuous exercise or heavy lifting    Pregnancy and childbirth    Aging    Diarrhea  Date Last Reviewed: 7/1/2016 2000-2017 [employer]. [address] [place] [address], [place] [address]. All rights reserved. This information is not intended as a substitute for professional medical care. Always follow your healthcare professional's instructions.

## 2017-07-25 ENCOUNTER — TELEPHONE (OUTPATIENT)
Dept: OBGYN | Facility: CLINIC | Age: 32
End: 2017-07-25

## 2017-07-25 DIAGNOSIS — Z34.91 NORMAL PREGNANCY IN FIRST TRIMESTER: Primary | ICD-10-CM

## 2017-07-25 NOTE — TELEPHONE ENCOUNTER
Patient called would like to establish prenatal care   Patient LMP 17   Patient is   Patient complains of nausea, breast tenderness

## 2017-07-27 ENCOUNTER — OFFICE VISIT (OUTPATIENT)
Dept: FAMILY MEDICINE | Facility: CLINIC | Age: 32
End: 2017-07-27
Payer: COMMERCIAL

## 2017-07-27 VITALS
OXYGEN SATURATION: 97 % | SYSTOLIC BLOOD PRESSURE: 104 MMHG | DIASTOLIC BLOOD PRESSURE: 64 MMHG | TEMPERATURE: 98.9 F | BODY MASS INDEX: 32.24 KG/M2 | HEART RATE: 82 BPM | WEIGHT: 193.5 LBS | HEIGHT: 65 IN

## 2017-07-27 DIAGNOSIS — F41.1 GAD (GENERALIZED ANXIETY DISORDER): ICD-10-CM

## 2017-07-27 DIAGNOSIS — F33.41 RECURRENT MAJOR DEPRESSIVE DISORDER, IN PARTIAL REMISSION (H): ICD-10-CM

## 2017-07-27 DIAGNOSIS — Z3A.01 LESS THAN 8 WEEKS GESTATION OF PREGNANCY: Primary | ICD-10-CM

## 2017-07-27 LAB — BETA HCG QUAL IFA URINE: POSITIVE

## 2017-07-27 PROCEDURE — 84703 CHORIONIC GONADOTROPIN ASSAY: CPT | Performed by: FAMILY MEDICINE

## 2017-07-27 PROCEDURE — 99214 OFFICE O/P EST MOD 30 MIN: CPT | Performed by: FAMILY MEDICINE

## 2017-07-27 RX ORDER — LORATADINE 10 MG/1
10 TABLET, ORALLY DISINTEGRATING ORAL DAILY
COMMUNITY
End: 2017-09-12

## 2017-07-27 ASSESSMENT — ANXIETY QUESTIONNAIRES
4. TROUBLE RELAXING: SEVERAL DAYS
1. FEELING NERVOUS, ANXIOUS, OR ON EDGE: SEVERAL DAYS
7. FEELING AFRAID AS IF SOMETHING AWFUL MIGHT HAPPEN: NOT AT ALL
GAD7 TOTAL SCORE: 5
GAD7 TOTAL SCORE: 5
2. NOT BEING ABLE TO STOP OR CONTROL WORRYING: SEVERAL DAYS
7. FEELING AFRAID AS IF SOMETHING AWFUL MIGHT HAPPEN: NOT AT ALL
5. BEING SO RESTLESS THAT IT IS HARD TO SIT STILL: NOT AT ALL
3. WORRYING TOO MUCH ABOUT DIFFERENT THINGS: SEVERAL DAYS
6. BECOMING EASILY ANNOYED OR IRRITABLE: SEVERAL DAYS
GAD7 TOTAL SCORE: 5

## 2017-07-27 ASSESSMENT — PATIENT HEALTH QUESTIONNAIRE - PHQ9
SUM OF ALL RESPONSES TO PHQ QUESTIONS 1-9: 8
SUM OF ALL RESPONSES TO PHQ QUESTIONS 1-9: 8

## 2017-07-27 NOTE — PROGRESS NOTES
SUBJECTIVE:                                                    Vincent Burris is a 31 year old female who presents to clinic today for the following health issues:      Confirmation of Pregnancy     Description (location/character/radiation): blood test /urine  . Period was due yesterday. 3 to 4 positive urine hCG tests . Pt reports lines being very faint     Patient would like to do blood work but then changed her mind and said would like to confirm with urinary pregnancy the clinic.    Has had some nausea, feeling tired, breast discomfort  But no vomiting.     Has apt to see gyn in august for u/S and first ob visit    Did drink some alcohol last week when didn't realize was pregnant      Depression and Anxiety Follow-Up    Status since last visit: No change    Other associated symptoms:None    Complicating factors:     Significant life event: Yes-  New pregnancy    Current substance abuse: None    PHQ-9 SCORE 7/20/2016 4/10/2017 7/27/2017   Total Score MyChart - - 8 (Mild depression)   Total Score 5 6 8     SOLANGE-7 SCORE 7/20/2016 4/10/2017 7/27/2017   Total Score - - 5 (mild anxiety)   Total Score 0 6 5       PHQ-9  English  PHQ-9   Any Language  GAD7  Answers for HPI/ROS submitted by the patient on 7/27/2017   PHQ9 TOTAL SCORE: 8  SOLANGE 7 TOTAL SCORE: 5  PHQ-9 ([employer]) 7/27/2017   1.  Little interest or pleasure in doing things 1   2.  Feeling down, depressed, or hopeless 1   3.  Trouble falling or staying asleep, or sleeping too much 3   4.  Feeling tired or having little energy 1   5.  Poor appetite or overeating 1   6.  Feeling bad about yourself 0   7.  Trouble concentrating 1   8.  Moving slowly or restless 0   9.  Suicidal or self-harm thoughts 0   PHQ-9 Total Score 8   Difficulty at work, home, or with people    1.  Little interest or pleasure in doing things Several days   2.  Feeling down, depressed, or hopeless Several days   3.  Trouble falling or staying asleep, or sleeping too much Nearly every  "day   4.  Feeling tired or having little energy Several days   5.  Poor appetite or overeating Several days   6.  Feeling bad about yourself Not at all   7.  Trouble concentrating Several days   8.  Moving slowly or restless Not at all   9.  Suicidal or self-harm thoughts Not at all   PHQ-9 via [facility] TOTAL SCORE-----> 8 (Mild depression)   SOLANGE-7   [employer], 2002; Used with Permission) 7/27/2017   1. Feeling nervous, anxious, or on edge Several days   2. Not being able to stop or control worrying Several days   3. Worrying too much about different things Several days   4. Trouble relaxing Several days   5. Being so restless that it is hard to sit still Not at all   6. Becoming easily annoyed or irritable Several days   7. Feeling afraid, as if something awful might happen Not at all   SOLANGE 7 TOTAL SCORE 5 (mild anxiety)   1. Feeling nervous, anxious, or on edge 1   2. Not being able to stop or control worrying 1   3. Worrying too much about different things 1   4. Trouble relaxing 1   5. Being so restless that it is hard to sit still 0   6. Becoming easily annoyed or irritable 1   7. Feeling afraid, as if something awful might happen 0   SOLANGE-7 Total Score 5   If you checked any problems, how difficult have they made it for you to do your work, take care of things at home, or get along with other people?    In previous pregnancy was continued on sertraline alone     Problem list and histories reviewed & adjusted, as indicated.  Additional history: as documented    Patient Active Problem List   Diagnosis     SOLANGE (generalized anxiety disorder)     BMI 32.0-32.9,adult     Recurrent major depressive disorder, in partial remission (H)     History of herpes genitalis     Past Surgical History:   Procedure Laterality Date     APPENDECTOMY  1999     C/SECTION, LOW TRANSVERSE  10/31/ 2005    \"Elaina \"     COLPOSCOPY, BIOPSY, COMBINED  02/16/2005    + pap, bx 12 & 6 o clock,ECC: FAYE I     DILATION AND CURETTAGE SUCTION, TREAT " "INCOMPLETE   2009     VAG DELIV ONLY,PREV C-SECTN  2012    \" Pedro\"       Social History   Substance Use Topics     Smoking status: Former Smoker     Packs/day: 0.10     Years: 8.00     Types: Cigarettes     Quit date: 2011     Smokeless tobacco: Never Used     Alcohol use No      Comment: stopped for pregnancy , Drinks 3-5 drinks 1-2 nights per weekend     Family History   Problem Relation Age of Onset     Breast Cancer Maternal Grandmother      DIABETES Maternal Grandfather      KIDNEY DISEASE Paternal Grandmother      Skin Cancer No family hx of      Melanoma No family hx of      Hypertension No family hx of      Coronary Artery Disease No family hx of      CEREBROVASCULAR DISEASE No family hx of      Other Cancer No family hx of          Current Outpatient Prescriptions   Medication Sig Dispense Refill     loratadine (CLARITIN REDITABS) 10 MG ODT tab Take 10 mg by mouth daily       sertraline (ZOLOFT) 100 MG tablet Take 1 tablet (100 mg) by mouth daily 90 tablet 3     buPROPion (WELLBUTRIN XL) 150 MG 24 hr tablet Take 1 tablet (150 mg) by mouth every morning 30 tablet 3     fluticasone (FLONASE) 50 MCG/ACT nasal spray Spray 1 spray into both nostrils daily 1 Bottle 11     Allergies   Allergen Reactions     Zithromax [Azithromycin] Hives     hives     Recent Labs   Lab Test  06/15/16   1633   TSH  1.44      BP Readings from Last 3 Encounters:   17 104/64   17 116/78   17 118/80    Wt Readings from Last 3 Encounters:   17 193 lb 8 oz (87.8 kg)   17 188 lb (85.3 kg)   17 201 lb (91.2 kg)                  Labs reviewed in EPIC          Reviewed and updated as needed this visit by clinical staffTobacco  Allergies  Meds  Med Hx  Surg Hx  Fam Hx  Soc Hx      Reviewed and updated as needed this visit by Provider  Allergies  Meds         ROS:  Constitutional, HEENT, cardiovascular, pulmonary, GI, , musculoskeletal, neuro, skin, endocrine and psych systems are " "negative, except as otherwise noted.      OBJECTIVE:   /64 (BP Location: Right arm, Patient Position: Chair, Cuff Size: Adult Large)  Pulse 82  Temp 98.9  F (37.2  C) (Oral)  Ht 5' 5\" (1.651 m)  Wt 193 lb 8 oz (87.8 kg)  SpO2 97%  BMI 32.2 kg/m2  Body mass index is 32.2 kg/(m^2).  GENERAL: healthy, alert and no distress  EYES: Eyes grossly normal to inspection, PERRL and conjunctivae and sclerae normal  HENT: ear canals and TM's normal, nose and mouth without ulcers or lesions  NECK: no adenopathy, no asymmetry, masses, or scars and thyroid normal to palpation  RESP: lungs clear to auscultation - no rales, rhonchi or wheezes  CV: regular rate and rhythm, normal S1 S2, no S3 or S4, no murmur, click or rub, no peripheral edema and peripheral pulses strong  ABDOMEN: soft, non tender, no hepatosplenomegaly, no masses and bowel sounds normal  MS: no gross musculoskeletal defects noted, no edema  SKIN: multiple tattoos no suspicious lesions or rashes  NEURO: Normal strength and tone, mentation intact and speech normal  PSYCH: mentation appears normal, affect normal/bright    Diagnostic Test Results:  Results for orders placed or performed in visit on 17   Beta HCG qual IFA urine   Result Value Ref Range    Beta HCG Qual IFA Urine Positive (A) NEG       ASSESSMENT/PLAN:   Hx of obesity, hidradenitis, former smoker quit in  prior genital herpes, cervical dysplasia I  FAYE I S/P Bx x 2 and colposcopy, later pap's reported normal, last pap 2015 NIL, prior C section & , with depression and anxiety on Wellbutrin and sertraline managed by primary has seen psyche before, here for pregnancy confirmation     1. Less than 8 weeks gestation of pregnancy  If can try and taper off Wellbutrin and just stay on sertraline would be good otherwise can wait to see psyche and gyn to manage psyche med's  Keep apt with gyn   Vit B 6 to help with nausea  , prenatal vitamins, avoid alcohol  - Beta HCG qual IFA " urine    2. SOLANGE (generalized anxiety disorder)  - MENTAL HEALTH REFERRAL    3. Recurrent major depressive disorder, in partial remission (H)  - MENTAL HEALTH REFERRAL    4. BMI 32.0-32.9,adult  Stay active , eat healthy       See Patient Instructions  Patient Instructions   If can try and taper off wellbutrin and just stay on sertraline would be good otherwise can wait to see psyche and gynto manage psyche meds  Keep apt with gyn         Em Black MD  [facility]

## 2017-07-27 NOTE — MR AVS SNAPSHOT
After Visit Summary   7/27/2017    Vincent Burris    MRN: [id number]           Patient Information     Date Of Birth          1985        Visit Information        Provider Department      7/27/2017 5:40 PM Em Black MD [facility]        Today's Diagnoses     Less than 8 weeks gestation of pregnancy    -  1    SOLANGE (generalized anxiety disorder)        Recurrent major depressive disorder, in partial remission (H)          Care Instructions    If can try and taper off wellbutrin and just stay on sertraline would be good otherwise can wait to see psyche and gynto manage psyche meds  Keep apt with gyn             Follow-ups after your visit        Additional Services     MENTAL HEALTH REFERRAL       Your provider has referred you to: [facility] Providers Intake Scheduling [phone]  [email or web address] would prefer [facility]  Psychiatrist to manage meds in pregnancy     All scheduling is subject to the client's specific insurance plan & benefits, provider/location availability, and provider clinical specialities.  Please arrive 15 minutes early for your first appointment and bring your completed paperwork.    Please be aware that coverage of these services is subject to the terms and limitations of your health insurance plan.  Call member services at your health plan with any benefit or coverage questions.                  Your next 10 appointments already scheduled     Aug 28, 2017  8:00 AM CDT   ULTRASOUND with [facility] ULTRASOUND   [facility] ([facility])    [place] [facility] [address]  [address]  [facility] [id number] [phone]            Aug 28, 2017  8:45 AM CDT   OB INTAKE with MART Ortega CNM   [facility] ([facility])    [place] [facility] [address]  [address]  [facility] [id number] [phone]            "Congratulations! You're Pregnant.  At [facility] we think of you as part of our team, working together toward a successful pregnancy and a healthy baby.  You might already have questions about all the different things that are happening to your body.  We have attached a link to our book \"The Expectant Family- From Pregnancy through Childbirth\" [email or web address]/[email or web address] to help answer some of those concerns. (You will be given a hard copy at your first visit in clinic)  Chapter 2 (page 20) is a must read- from questions about morning sickness, headaches, warning signs to look for, to:\"why do I have to go to the bathroom so often?\"   Our Doctors, Resident Physicians, Certified nurse midwives do work closely together as a team both in clinic and in the hospital to make this experience remarkable. Our patients have on numerous occasions expressed their satisfaction in knowing that there is always a provider at the hospital or on the phone no matter what time of the day it is, to talk, triage or deliver their babies.  Your time is very important to us, so we will like you to know what to expect.  Routine Prenatal Visit Schedule:  Visit 1: 8 Weeks - Dating Ultrasound and Intake with Nurse  Visit 2: 10-12 Weeks- First Prenatal Visit & Exam with Midwife or Resident Physician, scheduling for early genetic screening at [facility] (optional)  Visit 3: 16-18 Weeks 18-20 Weeks- Level II Ultrasound done at [facility]- 4th Floor  Visit 4: 22-24 weeks  Visit 5: 28 Weeks- Extended Education Visit with Midwife or Resident Physician  Visit 6: 32 Weeks  Visit 7: 36 Weeks  Visit 8-11: 38-41 Weeks (Weekly Visits)  Changes can or may occur during your pregnancy. Your provider may ask you to come in more often for testing or monitoring frequently than stated above.  If you still have questions our triage nurses will be more than welcome to help you. Send us a message via MY Chart, our [employer] " "Portal or give us a call anytime at [phone].  Thank You for allowing us to be part of your care.  Yours sincerely,  [facility]              Who to contact     If you have questions or need follow up information about today's clinic visit or your schedule please contact [facility] directly at [phone].  Normal or non-critical lab and imaging results will be communicated to you by Joyushart, letter or phone within 4 business days after the clinic has received the results. If you do not hear from us within 7 days, please contact the clinic through Nexidiat or phone. If you have a critical or abnormal lab result, we will notify you by phone as soon as possible.  Submit refill requests through [employer] or call your pharmacy and they will forward the refill request to us. Please allow 3 business days for your refill to be completed.          Additional Information About Your Visit        [employer] Information     [employer] gives you secure access to your electronic health record. If you see a primary care provider, you can also send messages to your care team and make appointments. If you have questions, please call your primary care clinic.  If you do not have a primary care provider, please call [phone] and they will assist you.        Care EveryWhere ID     This is your Care EveryWhere ID. This could be used by other organizations to access your [facility] medical records  [id number]        Your Vitals Were     Pulse Temperature Height Pulse Oximetry BMI (Body Mass Index)       82 98.9  F (37.2  C) (Oral) 5' 5\" (1.651 m) 97% 32.2 kg/m2        Blood Pressure from Last 3 Encounters:   07/27/17 104/64   06/30/17 116/78   02/24/17 118/80    Weight from Last 3 Encounters:   07/27/17 193 lb 8 oz (87.8 kg)   06/30/17 188 lb (85.3 kg)   02/24/17 201 lb (91.2 kg)              We Performed the Following     Beta HCG qual IFA urine     MENTAL HEALTH REFERRAL        Primary Care Provider Office " Phone # Fax #    Xiomara Mariela Jamee Smith [phone]       [address]  [facility] [address]        Equal Access to Services     JASVIR ADKINS: Hadii aad ku hadbirgito Somarlenali, waaxda luqadaha, qaybta kaalmada adeegyada, marichuy alonsoregis pritchettjose david ag suleiman adkins. So [facility] [phone].    ATENCIÓN: Si habla español, tiene a alston disposición servicios gratuitos de asistencia lingüística. Llame al [phone].    We comply with applicable federal civil rights laws and [place] laws. We do not discriminate on the basis of race, color, national origin, age, disability sex, sexual orientation or gender identity.            Thank you!     Thank you for choosing [facility]  for your care. Our goal is always to provide you with excellent care. Hearing back from our patients is one way we can continue to improve our services. Please take a few minutes to complete the written survey that you may receive in the mail after your visit with us. Thank you!             Your Updated Medication List - Protect others around you: Learn how to safely use, store and throw away your medicines at www.disposemymeds.org.          This list is accurate as of: 7/27/17  6:29 PM.  Always use your most recent med list.                   Brand Name Dispense Instructions for use Diagnosis    buPROPion 150 MG 24 hr tablet    WELLBUTRIN XL    30 tablet    Take 1 tablet (150 mg) by mouth every morning    SOLANGE (generalized anxiety disorder)       fluticasone 50 MCG/ACT spray    FLONASE    1 Bottle    Spray 1 spray into both nostrils daily    Seasonal allergic rhinitis       loratadine 10 MG ODT tab    CLARITIN REDITABS     Take 10 mg by mouth daily        sertraline 100 MG tablet    ZOLOFT    90 tablet    Take 1 tablet (100 mg) by mouth daily    SOLANGE (generalized anxiety disorder)

## 2017-07-27 NOTE — NURSING NOTE
"Chief Complaint   Patient presents with     Confirmation Of Pregnancy     blood test /urine  . Period was do yesterday. 3 to 4 positive urine hcg tests . Pt reports lines being very faint        Initial /64 (BP Location: Right arm, Patient Position: Chair, Cuff Size: Adult Large)  Pulse 82  Temp 98.9  F (37.2  C) (Oral)  Ht 5' 5\" (1.651 m)  Wt 193 lb 8 oz (87.8 kg)  SpO2 97%  BMI 32.2 kg/m2 Estimated body mass index is 32.2 kg/(m^2) as calculated from the following:    Height as of this encounter: 5' 5\" (1.651 m).    Weight as of this encounter: 193 lb 8 oz (87.8 kg).  Medication Reconciliation: complete     "

## 2017-07-27 NOTE — PATIENT INSTRUCTIONS
If can try and taper off wellbutrin and just stay on sertraline would be good otherwise can wait to see psyche and gynto manage psyche meds  Keep apt with gyn

## 2017-07-28 ASSESSMENT — PATIENT HEALTH QUESTIONNAIRE - PHQ9: SUM OF ALL RESPONSES TO PHQ QUESTIONS 1-9: 8

## 2017-07-28 ASSESSMENT — ANXIETY QUESTIONNAIRES: GAD7 TOTAL SCORE: 5

## 2017-07-30 PROBLEM — F33.41 RECURRENT MAJOR DEPRESSIVE DISORDER, IN PARTIAL REMISSION (H): Status: ACTIVE | Noted: 2017-07-30

## 2017-08-02 ENCOUNTER — TELEPHONE (OUTPATIENT)
Dept: FAMILY MEDICINE | Facility: CLINIC | Age: 32
End: 2017-08-02

## 2017-08-03 NOTE — TELEPHONE ENCOUNTER
----- Message from Beatriz Almonte sent at 8/2/2017  8:28 AM CDT -----  Regarding: MENTAL HEALTH ORDER  Several attempts have been made to contact the patient. A letter has been sent advising the patient to contact [facility].    Beatriz Almonte  Intake Assistant, [facility]

## 2017-08-12 ENCOUNTER — HOSPITAL ENCOUNTER (EMERGENCY)
Facility: CLINIC | Age: 32
Discharge: HOME | End: 2017-08-12
Attending: EMERGENCY MEDICINE | Admitting: EMERGENCY MEDICINE
Payer: COMMERCIAL

## 2017-08-12 ENCOUNTER — APPOINTMENT (OUTPATIENT)
Dept: ULTRASOUND IMAGING | Facility: CLINIC | Age: 32
End: 2017-08-12
Attending: EMERGENCY MEDICINE
Payer: COMMERCIAL

## 2017-08-12 VITALS
HEIGHT: 66 IN | DIASTOLIC BLOOD PRESSURE: 76 MMHG | SYSTOLIC BLOOD PRESSURE: 114 MMHG | OXYGEN SATURATION: 98 % | WEIGHT: 194 LBS | TEMPERATURE: 99.2 F | HEART RATE: 85 BPM | RESPIRATION RATE: 16 BRPM | BODY MASS INDEX: 31.18 KG/M2

## 2017-08-12 DIAGNOSIS — O20.0 THREATENED MISCARRIAGE IN EARLY PREGNANCY: ICD-10-CM

## 2017-08-12 LAB
ABO + RH BLD: NORMAL
ALBUMIN UR-MCNC: 10 MG/DL
APPEARANCE UR: CLEAR
B-HCG SERPL-ACNC: ABNORMAL IU/L (ref 0–5)
BILIRUB UR QL STRIP: NEGATIVE
BLOOD BANK CMNT PATIENT-IMP: NORMAL
BLOOD BANK CMNT PATIENT-IMP: NORMAL
COLOR UR AUTO: YELLOW
DATE RH IMM GL GVN: NORMAL
FETAL CELL SCN BLD QL ROSETTE: NORMAL
GLUCOSE UR STRIP-MCNC: NEGATIVE MG/DL
HGB BLD-MCNC: 11.3 G/DL (ref 11.7–15.7)
HGB UR QL STRIP: ABNORMAL
KETONES UR STRIP-MCNC: NEGATIVE MG/DL
LEUKOCYTE ESTERASE UR QL STRIP: NEGATIVE
MUCOUS THREADS #/AREA URNS LPF: PRESENT /LPF
NITRATE UR QL: NEGATIVE
PH UR STRIP: 6.5 PH (ref 5–7)
RBC #/AREA URNS AUTO: 1 /HPF (ref 0–2)
RH IG VIALS RECOM PATIENT: NORMAL
SP GR UR STRIP: 1.02 (ref 1–1.03)
SPECIMEN EXP DATE BLD: NORMAL
SQUAMOUS #/AREA URNS AUTO: <1 /HPF (ref 0–1)
URN SPEC COLLECT METH UR: ABNORMAL
UROBILINOGEN UR STRIP-MCNC: 2 MG/DL (ref 0–2)
WBC #/AREA URNS AUTO: <1 /HPF (ref 0–2)

## 2017-08-12 PROCEDURE — 99285 EMERGENCY DEPT VISIT HI MDM: CPT | Mod: 25

## 2017-08-12 PROCEDURE — 81001 URINALYSIS AUTO W/SCOPE: CPT | Performed by: EMERGENCY MEDICINE

## 2017-08-12 PROCEDURE — 86900 BLOOD TYPING SEROLOGIC ABO: CPT | Performed by: EMERGENCY MEDICINE

## 2017-08-12 PROCEDURE — 85018 HEMOGLOBIN: CPT | Performed by: EMERGENCY MEDICINE

## 2017-08-12 PROCEDURE — 86901 BLOOD TYPING SEROLOGIC RH(D): CPT | Performed by: EMERGENCY MEDICINE

## 2017-08-12 PROCEDURE — 76801 OB US < 14 WKS SINGLE FETUS: CPT

## 2017-08-12 PROCEDURE — 85461 HEMOGLOBIN FETAL: CPT | Performed by: EMERGENCY MEDICINE

## 2017-08-12 PROCEDURE — 84702 CHORIONIC GONADOTROPIN TEST: CPT | Performed by: EMERGENCY MEDICINE

## 2017-08-12 NOTE — ED PROVIDER NOTES
"  History     Chief Complaint:  Vaginal bleeding in pregnancy    HPI   Vinecnt Burris is a 31 year old A2 female who presents with vaginal bleeding. The patient's LMP was in , and states she is about 6 weeks by dates but has not had an ultrasound to date. Patient had intercourse this afternoon with boyfriend, and then had a menstrual like red bleeding, that since has slowed to a pink discharge. No history of ectopic, no major pain. Patient brought to ER by boyfriend for evaluation.       Allergies:  Zithromax [Azithromycin] (hives)     Medications:    Loratadine  Sertraline  Bupropion  Fluticasone     Past Medical History:    Depression  Generalized anxiety disorder  Hx herpes genitalis   LGSIL on pap smear ()    Past Surgical History:    Appendectomy       Family History:    Ca, breast (maternal grandmother)    Social History:  Former 0.1ppd tobacco smoker x7 years, quit .  Has a boyfriend.       Review of Systems  Vaginal bleeding, mild cramps but no severe pain, all other systems negative.      Physical Exam     Patient Vitals for the past 24 hrs:   BP Temp Temp src Pulse Resp SpO2 Height Weight   17 114/76 - - 85 16 98 % - -   17 1650 128/76 99.2  F (37.3  C) Temporal 87 16 100 % 1.676 m (5' 6\") 88 kg (194 lb)      Physical Exam   Constitutional: She is oriented to person, place, and time. She appears well-developed.   HENT:   Head: Normocephalic and atraumatic.   Right Ear: External ear normal.   Mouth/Throat: Oropharynx is clear and moist.   Eyes: Conjunctivae and EOM are normal. Pupils are equal, round, and reactive to light.   Neck: Normal range of motion. Neck supple. No JVD present.   Cardiovascular: Normal rate, regular rhythm and normal heart sounds.    Pulmonary/Chest: Effort normal and breath sounds normal.   Abdominal: Soft. Bowel sounds are normal. She exhibits no distension. There is no tenderness. There is no rebound.   Musculoskeletal: Normal range of " motion.   Lymphadenopathy:     She has no cervical adenopathy.   Neurological: She is alert and oriented to person, place, and time. She displays normal reflexes. No cranial nerve deficit. She exhibits normal muscle tone. Coordination normal.   Skin: Skin is warm and dry.   Psychiatric: She has a normal mood and affect. Her behavior is normal. Judgment normal.   Nursing note and vitals reviewed.      Emergency Department Course   Imaging:  Radiographic findings were communicated with the patient and family who voiced understanding of the findings.  US OB <14 weeks w/ transvag: Live IUP at 6 weeks 3 day  Results per Radiology.     Laboratory:  UA: Trace blood, 10 protein, mucous present, o/w negative  b-HC (H)    Blood type and screen: B positive    Hemoglobin: 11.3 (L)      Emergency Department Course:  Past medical records, nursing notes, and vitals reviewed.   I performed an exam of the patient as documented above.   Peripheral IV access established. Blood drawn and sent. The above imaging study and labs were obtained.  : I rechecked patient.   Clinical findings and plan explained to the Patient. Patient discharged home with instructions regarding supportive care, medications, and reasons to return as well as the importance of close follow-up were reviewed.        Impression & Plan    Medical Decision Making:  The patient presents with vaginal bleeding after intercourse. Ultrasound shows an intrauterine pregnancy, heart rate is a bit low though suspect threatened miscarriage. The patient is offered reassurance, pelvic rest recommendations, and follow up with ObGyn.    Diagnosis:    ICD-10-CM    1. Threatened miscarriage/vaginal bleeding in early pregnancy O20.0        Disposition:  discharged to home      Jm ECHEVARRIA, am serving as a scribe at 6:36 PM on 2017 to document services personally performed by Fabrizio Wyatt MD based on my observations and the provider's statements to me.       Jm Chand  8/12/2017    EMERGENCY DEPARTMENT       Fabrizio Wyatt MD  08/17/17 0039

## 2017-08-12 NOTE — ED AVS SNAPSHOT
Emergency Department    [address] [facility] [address]    Phone:  [phone]    Fax:  [phone]                                       Vincent Burris   MRN: [id number]    Department:   Emergency Department   Date of Visit:  8/12/2017           Patient Information     Date Of Birth          1985        Your diagnoses for this visit were:     Threatened miscarriage in early pregnancy        You were seen by Fabrizio Wyatt MD.      Follow-up Information     Follow up with Xiomara Lovelace MD In 3 days.    Specialty:  Internal Medicine    Why:  for repeat hcg level    Contact information:    [address] [facility] [address]  [facility] [place] [address] [phone]          Discharge Instructions       Discharge Instructions  Vaginal Bleeding in Pregnancy    Bleeding in early pregnancy can be a sign of a miscarriage in process or an abnormal pregnancy, but often is innocent and the pregnancy will continue normally. We may do blood pregnancy tests and ultrasound to try to determine what is causing the bleeding in your case, but sometimes we can't tell and need to follow you with time, more blood tests, and another ultrasound.     Return to the Emergency Department if:    You have severe abdominal or pelvic pain.    You faint, or feel lightheaded or dizzy.     Your bleeding is gets much worse, and is heavier than a heavy period or if you pass any blood clots larger than a quarter.    You pass any tissue--solid material that doesn't appear smooth and even like a blood clot. If you pass tissue, save it (even if you have to pull it out of the toilet) and put it in a plastic bag or jar and bring it in.      You have a fever of 100.5 degrees or higher.  If no pregnancy could be seen:    It may be that everything is normal and it is just too early to see the pregnancy, but you could have an ectopic pregnancy, which is a pregnancy in an abnormal location, such as in the tube. An  ectopic pregnancy can cause severe internal bleeding or death.    You need to be seen by your regular doctor, or an OB/GYN doctor, in 48-72 hours for a repeat blood pregnancy test.    You should not be alone, in case you suddenly become very sick.     You should not have sex or put anything in your vagina.     If a pregnancy was seen in your uterus:    If a heartbeat could be seen, the chance of miscarriage is much lower.    You need to see your regular doctor, or an OB/GYN doctor, within 2-3 days.    You should not have sex or put anything in your vagina.     Facts about miscarriage: We hope you don't have a miscarriage, but if you do, here are important things to know:    Early miscarriage is very common, and having one miscarriage doesn't mean you will have problems with another pregnancy.    Nothing you did caused it. Taking medicine, drinking alcohol, having sex, exercising, or falling down won't cause a miscarriage.     If you were given a prescription for medicine here today, be sure to read all of the information (including the package insert) that comes with your prescription.  This will include important information about the medicine, its side effects, and any warnings that you need to know about.  The pharmacist who fills the prescription can provide more information and answer questions you may have about the medicine.  If you have questions or concerns that the pharmacist cannot address, please call or return to the Emergency Department.     Opioid Medication Information    Pain medications are among the most commonly prescribed medicines, so we are including this information for all our patients. If you did not receive pain medication or get a prescription for pain medicine, you can ignore it.     You may have been given a prescription for an opioid (narcotic) pain medicine and/or have received a pain medicine while here in the Emergency Department. These medicines can make you drowsy or impaired. You  must not drive, operate dangerous equipment, or engage in any other dangerous activities while taking these medications. If you drive while taking these medications, you could be arrested for DUI, or driving under the influence. Do not drink any alcohol while you are taking these medications.     Opioid pain medications can cause addiction. If you have a history of chemical dependency of any type, you are at a higher risk of becoming addicted to pain medications.  Only take these prescribed medications to treat your pain when all other options have been tried. Take it for as short a time and as few doses as possible. Store your pain pills in a secure place, as they are frequently stolen and provide a dangerous opportunity for children or visitors in your house to start abusing these powerful medications. We will not replace any lost or stolen medicine.  As soon as your pain is better, you should flush all your remaining medication.     Many prescription pain medications contain Tylenol  (acetaminophen), including Vicodin , Tylenol #3 , Norco , Lortab , and Percocet .  You should not take any extra pills of Tylenol  if you are using these prescription medications or you can get very sick.  Do not ever take more than 3000 mg of acetaminophen in any 24 hour period.    All opioids tend to cause constipation. Drink plenty of water and eat foods that have a lot of fiber, such as fruits, vegetables, prune juice, apple juice and high fiber cereal.  Take a laxative if you don t move your bowels at least every other day. Miralax , Milk of Magnesia, Colace , or Senna  can be used to keep you regular.      Remember that you can always come back to the Emergency Department if you are not able to see your regular doctor in the amount of time listed above, if you get any new symptoms, or if there is anything that worries you.          Future Appointments        Provider Department Dept Phone Center    8/28/2017 8:00 AM SINDY  Ultrasound [facility] [phone] [facility]    8/28/2017 8:45 AM MART Moreno CNM [facility] [phone] [facility] CLIN      24 Hour Appointment Hotline       To make an appointment at any [facility], call [phone] ([phone]). If you don't have a family doctor or clinic, we will help you find one. [facility] are conveniently located to serve the needs of you and your family.             Review of your medicines      Our records show that you are taking the medicines listed below. If these are incorrect, please call your family doctor or clinic.        Dose / Directions Last dose taken    buPROPion 150 MG 24 hr tablet   Commonly known as:  WELLBUTRIN XL   Dose:  150 mg   Quantity:  30 tablet        Take 1 tablet (150 mg) by mouth every morning   Refills:  3        fluticasone 50 MCG/ACT spray   Commonly known as:  FLONASE   Dose:  1 spray   Quantity:  1 Bottle        Spray 1 spray into both nostrils daily   Refills:  11        loratadine 10 MG ODT tab   Commonly known as:  CLARITIN REDITABS   Dose:  10 mg        Take 10 mg by mouth daily   Refills:  0        sertraline 100 MG tablet   Commonly known as:  ZOLOFT   Dose:  100 mg   Quantity:  90 tablet        Take 1 tablet (100 mg) by mouth daily   Refills:  3                Procedures and tests performed during your visit     HCG QUANTitative pregnancy    Hemoglobin    Rh Immune Globulin Study    Rh type    Rho (D) immune globulin (RhoGam) Lab Study    UA reflex to Microscopic    US OB < 14 Weeks w Transvaginal      Orders Needing Specimen Collection     None      Pending Results     Date and Time Order Name Status Description    8/12/2017 1829 US OB < 14 Weeks w Transvaginal Preliminary             Pending Culture Results     No orders found from 8/10/2017 to 8/13/2017.            Pending Results Instructions     If you had any lab results that were not finalized at the time of your  Discharge, you can call the ED Lab Result RN at [phone]. You will be contacted by this team for any positive Lab results or changes in treatment. The nurses are available 7 days a week from 10A to 6:30P.  You can leave a message 24 hours per day and they will return your call.        Test Results From Your Hospital Stay        8/12/2017  5:52 PM      Component Results     Component Value Ref Range & Units Status    Color Urine Yellow  Final    Appearance Urine Clear  Final    Glucose Urine Negative NEG mg/dL Final    Bilirubin Urine Negative NEG Final    Ketones Urine Negative NEG mg/dL Final    Specific Gravity Urine 1.024 1.003 - 1.035 Final    Blood Urine Trace (A) NEG Final    pH Urine 6.5 5.0 - 7.0 pH Final    Protein Albumin Urine 10 (A) NEG mg/dL Final    Urobilinogen mg/dL 2.0 0.0 - 2.0 mg/dL Final    Nitrite Urine Negative NEG Final    Leukocyte Esterase Urine Negative NEG Final    Source Midstream Urine  Final    RBC Urine 1 0 - 2 /HPF Final    WBC Urine <1 0 - 2 /HPF Final    Squamous Epithelial /HPF Urine <1 0 - 1 /HPF Final    Mucous Urine Present (A) NEG /LPF Final         8/12/2017  6:51 PM      Component Results     Component Value Ref Range & Units Status    Hemoglobin 11.3 (L) 11.7 - 15.7 g/dL Final         8/12/2017  7:43 PM      Component Results     Component Value Ref Range & Units Status    HCG Quantitative Serum 06352 (H) 0 - 5 IU/L Final         8/12/2017  7:21 PM      Component Results     Component    ABO    B    RH(D)     Pos    Specimen Expires    08/15/2017         8/12/2017  6:48 PM      Component Results     Component    Rhogam Order    Order received   See Rhogam Study/Suitability           8/12/2017  7:53 PM      Narrative     US OBSTETRIC <14 WEEKS WITH TRANSVAGINAL SINGLE  8/12/2017 7:43 PM    HISTORY: Vaginal bleeding pregnancy.    TECHNIQUE: Transabdominal  and transvaginal imaging were performed.   Transvaginal imaging was performed to better evaluate the uterus  and  gestational sac.    COMPARISON:  None.    FINDINGS:      Estimated gestational age by current ultrasound measurement: 6 weeks 3  days.  Estimated date of delivery based on this ultrasound: 4/4/2018.  Embryonic cardiac activity: 111 bpm.   Yolk sac: Present.  Subchorionic hemorrhage: None.    Right ovary: 2.5 cm complex lesion which may well represent a corpus  luteum.  Left ovary: Unremarkable.  Adnexal mass: None.  Free pelvic fluid: None.        Impression     IMPRESSION: Live IUP at 6 weeks 3 days.         8/12/2017  7:25 PM      Component Results     Component    ABO    B    RH(D)     Pos    Fetal Blood Screen    Canceled, Test credited    Blood Bank Comment    Not suitable for Rh Immune Globulin    RhIg Administered    Not suitable for Rh Immune Globulin    Amount of RHIG Required    Not suitable for Rh Immune Globulin                Clinical Quality Measure: Blood Pressure Screening     Your blood pressure was checked while you were in the emergency department today. The last reading we obtained was  BP: 128/76 . Please read the guidelines below about what these numbers mean and what you should do about them.  If your systolic blood pressure (the top number) is less than 120 and your diastolic blood pressure (the bottom number) is less than 80, then your blood pressure is normal. There is nothing more that you need to do about it.  If your systolic blood pressure (the top number) is 120-139 or your diastolic blood pressure (the bottom number) is 80-89, your blood pressure may be higher than it should be. You should have your blood pressure rechecked within a year by a primary care provider.  If your systolic blood pressure (the top number) is 140 or greater or your diastolic blood pressure (the bottom number) is 90 or greater, you may have high blood pressure. High blood pressure is treatable, but if left untreated over time it can put you at risk for heart attack, stroke, or kidney failure. You should  have your blood pressure rechecked by a primary care provider within the next 4 weeks.  If your provider in the emergency department today gave you specific instructions to follow-up with your doctor or provider even sooner than that, you should follow that instruction and not wait for up to 4 weeks for your follow-up visit.        Thank you for choosing [facility]       Thank you for choosing [facility] for your care. Our goal is always to provide you with excellent care. Hearing back from our patients is one way we can continue to improve our services. Please take a few minutes to complete the written survey that you may receive in the mail after you visit with us. Thank you!        [employer] Information     [facility] gives you secure access to your electronic health record. If you see a primary care provider, you can also send messages to your care team and make appointments. If you have questions, please call your primary care clinic.  If you do not have a primary care provider, please call [phone] and they will assist you.        Care EveryWhere ID     This is your Care EveryWhere ID. This could be used by other organizations to access your [facility] medical records  [id number]        Equal Access to Services     [facility]AS[facility] : Hadnica Mcelroy, sara walker, qamarichuy ramirez. So [facility] [phone].    ATENCIÓN: Si habla español, tiene a alston disposición servicios gratuitos de asistencia lingüística. Llame al [phone].    We comply with applicable federal civil rights laws and [place] laws. We do not discriminate on the basis of race, color, national origin, age, disability sex, sexual orientation or gender identity.            After Visit Summary       This is your record. Keep this with you and show to your community pharmacist(s) and doctor(s) at your next visit.

## 2017-08-12 NOTE — ED AVS SNAPSHOT
Emergency Department    [address] [facility] [address]    Phone:  [phone]    Fax:  [phone]                                       Vincent Burris   MRN: [id number]    Department:   Emergency Department   Date of Visit:  8/12/2017           After Visit Summary Signature Page     I have received my discharge instructions, and my questions have been answered. I have discussed any challenges I see with this plan with the nurse or doctor.    ..........................................................................................................................................  Patient/Patient Representative Signature      ..........................................................................................................................................  Patient Representative Print Name and Relationship to Patient    ..................................................               ................................................  Date                                            Time    ..........................................................................................................................................  Reviewed by Signature/Title    ...................................................              ..............................................  Date                                                            Time

## 2017-08-13 NOTE — DISCHARGE INSTRUCTIONS
Discharge Instructions  Vaginal Bleeding in Pregnancy    Bleeding in early pregnancy can be a sign of a miscarriage in process or an abnormal pregnancy, but often is innocent and the pregnancy will continue normally. We may do blood pregnancy tests and ultrasound to try to determine what is causing the bleeding in your case, but sometimes we can't tell and need to follow you with time, more blood tests, and another ultrasound.     Return to the Emergency Department if:    You have severe abdominal or pelvic pain.    You faint, or feel lightheaded or dizzy.     Your bleeding is gets much worse, and is heavier than a heavy period or if you pass any blood clots larger than a quarter.    You pass any tissue--solid material that doesn't appear smooth and even like a blood clot. If you pass tissue, save it (even if you have to pull it out of the toilet) and put it in a plastic bag or jar and bring it in.      You have a fever of 100.5 degrees or higher.  If no pregnancy could be seen:    It may be that everything is normal and it is just too early to see the pregnancy, but you could have an ectopic pregnancy, which is a pregnancy in an abnormal location, such as in the tube. An ectopic pregnancy can cause severe internal bleeding or death.    You need to be seen by your regular doctor, or an OB/GYN doctor, in 48-72 hours for a repeat blood pregnancy test.    You should not be alone, in case you suddenly become very sick.     You should not have sex or put anything in your vagina.     If a pregnancy was seen in your uterus:    If a heartbeat could be seen, the chance of miscarriage is much lower.    You need to see your regular doctor, or an OB/GYN doctor, within 2-3 days.    You should not have sex or put anything in your vagina.     Facts about miscarriage: We hope you don't have a miscarriage, but if you do, here are important things to know:    Early miscarriage is very common, and having one miscarriage doesn't mean  you will have problems with another pregnancy.    Nothing you did caused it. Taking medicine, drinking alcohol, having sex, exercising, or falling down won't cause a miscarriage.     If you were given a prescription for medicine here today, be sure to read all of the information (including the package insert) that comes with your prescription.  This will include important information about the medicine, its side effects, and any warnings that you need to know about.  The pharmacist who fills the prescription can provide more information and answer questions you may have about the medicine.  If you have questions or concerns that the pharmacist cannot address, please call or return to the Emergency Department.     Opioid Medication Information    Pain medications are among the most commonly prescribed medicines, so we are including this information for all our patients. If you did not receive pain medication or get a prescription for pain medicine, you can ignore it.     You may have been given a prescription for an opioid (narcotic) pain medicine and/or have received a pain medicine while here in the Emergency Department. These medicines can make you drowsy or impaired. You must not drive, operate dangerous equipment, or engage in any other dangerous activities while taking these medications. If you drive while taking these medications, you could be arrested for DUI, or driving under the influence. Do not drink any alcohol while you are taking these medications.     Opioid pain medications can cause addiction. If you have a history of chemical dependency of any type, you are at a higher risk of becoming addicted to pain medications.  Only take these prescribed medications to treat your pain when all other options have been tried. Take it for as short a time and as few doses as possible. Store your pain pills in a secure place, as they are frequently stolen and provide a dangerous opportunity for children or visitors  in your house to start abusing these powerful medications. We will not replace any lost or stolen medicine.  As soon as your pain is better, you should flush all your remaining medication.     Many prescription pain medications contain Tylenol  (acetaminophen), including Vicodin , Tylenol #3 , Norco , Lortab , and Percocet .  You should not take any extra pills of Tylenol  if you are using these prescription medications or you can get very sick.  Do not ever take more than 3000 mg of acetaminophen in any 24 hour period.    All opioids tend to cause constipation. Drink plenty of water and eat foods that have a lot of fiber, such as fruits, vegetables, prune juice, apple juice and high fiber cereal.  Take a laxative if you don t move your bowels at least every other day. Miralax , Milk of Magnesia, Colace , or Senna  can be used to keep you regular.      Remember that you can always come back to the Emergency Department if you are not able to see your regular doctor in the amount of time listed above, if you get any new symptoms, or if there is anything that worries you.

## 2017-08-15 ENCOUNTER — TELEPHONE (OUTPATIENT)
Dept: OBGYN | Facility: CLINIC | Age: 32
End: 2017-08-15

## 2017-08-15 ENCOUNTER — HOSPITAL ENCOUNTER (OUTPATIENT)
Dept: LAB | Facility: CLINIC | Age: 32
Discharge: HOME | End: 2017-08-15
Attending: ADVANCED PRACTICE MIDWIFE | Admitting: ADVANCED PRACTICE MIDWIFE
Payer: COMMERCIAL

## 2017-08-15 DIAGNOSIS — O20.9 FIRST TRIMESTER BLEEDING: ICD-10-CM

## 2017-08-15 DIAGNOSIS — O20.9 FIRST TRIMESTER BLEEDING: Primary | ICD-10-CM

## 2017-08-15 LAB — B-HCG SERPL-ACNC: ABNORMAL IU/L (ref 0–5)

## 2017-08-15 PROCEDURE — 84702 CHORIONIC GONADOTROPIN TEST: CPT | Performed by: ADVANCED PRACTICE MIDWIFE

## 2017-08-15 PROCEDURE — 36415 COLL VENOUS BLD VENIPUNCTURE: CPT | Performed by: ADVANCED PRACTICE MIDWIFE

## 2017-08-15 NOTE — TELEPHONE ENCOUNTER
Voicemail left for pt stating blood work as expected.  Keep plan of care for ultrasound as scheduled.  Routed to RN team should pt return call and desire additional information.

## 2017-08-15 NOTE — TELEPHONE ENCOUNTER
----- Message from Mony Welsh sent at 8/15/2017 10:05 AM CDT -----  Regarding: hCG order  Contact: [phone]  Patient is scheduled for her OB Intake on 8/28, but she was seen in the ER 8/12 for bleeding. She was told by the ER to have another hCG test in about 3 days. Patient is calling for an order for the hCG, so she can go to a /P lab & have it done. Please contact her at [phone] when order is placed. She said it was okay to leave a voicemail.    Thank you!  Tanvi    Call Center       Please DO NOT send this message and/or reply back to sender. Call Center Representatives DO NOT respond to messages.

## 2017-08-15 NOTE — TELEPHONE ENCOUNTER
Spoke to Vincent who was seen in ED 8/12/17 and was told to get another b-HCG.  She reports having scant amounts brown vaginal bleeding without any pain or cramping today .    She works by [employer] so will go this afternoon for repeat b-hcg. I let her know I'd forward plan to [facility] pool who will f/u on result and we would contact her if there is any change in plans.  She is scheduled for dating US and OB intake in clinic 8/28/17.    She is B positive blood type.    Instructed on heavy bleeding precautions and when to call clinic,Pt indicated understanding and agreed with plan.

## 2017-08-28 ENCOUNTER — OFFICE VISIT (OUTPATIENT)
Dept: OBGYN | Facility: CLINIC | Age: 32
End: 2017-08-28
Attending: OBSTETRICS & GYNECOLOGY
Payer: COMMERCIAL

## 2017-08-28 VITALS
WEIGHT: 198.1 LBS | HEIGHT: 66 IN | SYSTOLIC BLOOD PRESSURE: 114 MMHG | HEART RATE: 75 BPM | DIASTOLIC BLOOD PRESSURE: 76 MMHG | BODY MASS INDEX: 31.84 KG/M2

## 2017-08-28 DIAGNOSIS — Z86.19 HISTORY OF HERPES GENITALIS: ICD-10-CM

## 2017-08-28 DIAGNOSIS — F33.41 RECURRENT MAJOR DEPRESSIVE DISORDER, IN PARTIAL REMISSION (H): ICD-10-CM

## 2017-08-28 DIAGNOSIS — R11.0 NAUSEA: ICD-10-CM

## 2017-08-28 DIAGNOSIS — Z13.71 SCREENING FOR GENETIC DISEASE CARRIER STATUS: ICD-10-CM

## 2017-08-28 DIAGNOSIS — Z34.91 NORMAL PREGNANCY IN FIRST TRIMESTER: ICD-10-CM

## 2017-08-28 DIAGNOSIS — Z34.80 SUPERVISION OF OTHER NORMAL PREGNANCY, ANTEPARTUM: Primary | ICD-10-CM

## 2017-08-28 LAB
ABO + RH BLD: NORMAL
ABO + RH BLD: NORMAL
ALT SERPL W P-5'-P-CCNC: 20 U/L (ref 0–50)
ANION GAP SERPL CALCULATED.3IONS-SCNC: 8 MMOL/L (ref 3–14)
AST SERPL W P-5'-P-CCNC: 10 U/L (ref 0–45)
BASOPHILS # BLD AUTO: 0 10E9/L (ref 0–0.2)
BASOPHILS NFR BLD AUTO: 0.3 %
BLD GP AB SCN SERPL QL: NORMAL
BLOOD BANK CMNT PATIENT-IMP: NORMAL
BUN SERPL-MCNC: 8 MG/DL (ref 7–30)
CALCIUM SERPL-MCNC: 8.8 MG/DL (ref 8.5–10.1)
CHLORIDE SERPL-SCNC: 105 MMOL/L (ref 94–109)
CO2 SERPL-SCNC: 25 MMOL/L (ref 20–32)
CREAT SERPL-MCNC: 0.45 MG/DL (ref 0.52–1.04)
CREAT UR-MCNC: 199 MG/DL
DEPRECATED CALCIDIOL+CALCIFEROL SERPL-MC: 19 UG/L (ref 20–75)
DIFFERENTIAL METHOD BLD: ABNORMAL
EOSINOPHIL # BLD AUTO: 0.2 10E9/L (ref 0–0.7)
EOSINOPHIL NFR BLD AUTO: 2.6 %
ERYTHROCYTE [DISTWIDTH] IN BLOOD BY AUTOMATED COUNT: 12.6 % (ref 10–15)
GFR SERPL CREATININE-BSD FRML MDRD: >90 ML/MIN/1.7M2
GLUCOSE SERPL-MCNC: 88 MG/DL (ref 70–99)
HBV SURFACE AG SERPL QL IA: NONREACTIVE
HCT VFR BLD AUTO: 34.9 % (ref 35–47)
HGB BLD-MCNC: 11.7 G/DL (ref 11.7–15.7)
HIV 1+2 AB+HIV1 P24 AG SERPL QL IA: NONREACTIVE
IMM GRANULOCYTES # BLD: 0 10E9/L (ref 0–0.4)
IMM GRANULOCYTES NFR BLD: 0.3 %
LYMPHOCYTES # BLD AUTO: 1.9 10E9/L (ref 0.8–5.3)
LYMPHOCYTES NFR BLD AUTO: 26.3 %
MCH RBC QN AUTO: 28.8 PG (ref 26.5–33)
MCHC RBC AUTO-ENTMCNC: 33.5 G/DL (ref 31.5–36.5)
MCV RBC AUTO: 86 FL (ref 78–100)
MONOCYTES # BLD AUTO: 0.4 10E9/L (ref 0–1.3)
MONOCYTES NFR BLD AUTO: 5.1 %
NEUTROPHILS # BLD AUTO: 4.8 10E9/L (ref 1.6–8.3)
NEUTROPHILS NFR BLD AUTO: 65.4 %
NRBC # BLD AUTO: 0 10*3/UL
NRBC BLD AUTO-RTO: 0 /100
PLATELET # BLD AUTO: 280 10E9/L (ref 150–450)
POTASSIUM SERPL-SCNC: 4 MMOL/L (ref 3.4–5.3)
PROT UR-MCNC: 0.23 G/L
PROT/CREAT 24H UR: 0.11 G/G CR (ref 0–0.2)
RBC # BLD AUTO: 4.06 10E12/L (ref 3.8–5.2)
RUBV IGG SERPL IA-ACNC: 15 IU/ML
SODIUM SERPL-SCNC: 138 MMOL/L (ref 133–144)
SPECIMEN EXP DATE BLD: NORMAL
T PALLIDUM IGG+IGM SER QL: NEGATIVE
URATE SERPL-MCNC: 3 MG/DL (ref 2.6–6)
WBC # BLD AUTO: 7.3 10E9/L (ref 4–11)

## 2017-08-28 PROCEDURE — 87389 HIV-1 AG W/HIV-1&-2 AB AG IA: CPT | Performed by: ADVANCED PRACTICE MIDWIFE

## 2017-08-28 PROCEDURE — 84156 ASSAY OF PROTEIN URINE: CPT | Performed by: ADVANCED PRACTICE MIDWIFE

## 2017-08-28 PROCEDURE — 84450 TRANSFERASE (AST) (SGOT): CPT | Performed by: ADVANCED PRACTICE MIDWIFE

## 2017-08-28 PROCEDURE — 85025 COMPLETE CBC W/AUTO DIFF WBC: CPT | Performed by: ADVANCED PRACTICE MIDWIFE

## 2017-08-28 PROCEDURE — 86762 RUBELLA ANTIBODY: CPT | Performed by: ADVANCED PRACTICE MIDWIFE

## 2017-08-28 PROCEDURE — 86780 TREPONEMA PALLIDUM: CPT | Performed by: ADVANCED PRACTICE MIDWIFE

## 2017-08-28 PROCEDURE — 84550 ASSAY OF BLOOD/URIC ACID: CPT | Performed by: ADVANCED PRACTICE MIDWIFE

## 2017-08-28 PROCEDURE — 86901 BLOOD TYPING SEROLOGIC RH(D): CPT | Performed by: ADVANCED PRACTICE MIDWIFE

## 2017-08-28 PROCEDURE — 86900 BLOOD TYPING SEROLOGIC ABO: CPT | Performed by: ADVANCED PRACTICE MIDWIFE

## 2017-08-28 PROCEDURE — 80048 BASIC METABOLIC PNL TOTAL CA: CPT | Performed by: ADVANCED PRACTICE MIDWIFE

## 2017-08-28 PROCEDURE — 82306 VITAMIN D 25 HYDROXY: CPT | Performed by: ADVANCED PRACTICE MIDWIFE

## 2017-08-28 PROCEDURE — 99213 OFFICE O/P EST LOW 20 MIN: CPT | Mod: ZF

## 2017-08-28 PROCEDURE — 87340 HEPATITIS B SURFACE AG IA: CPT | Performed by: ADVANCED PRACTICE MIDWIFE

## 2017-08-28 PROCEDURE — 87086 URINE CULTURE/COLONY COUNT: CPT | Performed by: ADVANCED PRACTICE MIDWIFE

## 2017-08-28 PROCEDURE — 85027 COMPLETE CBC AUTOMATED: CPT | Performed by: ADVANCED PRACTICE MIDWIFE

## 2017-08-28 PROCEDURE — 76817 TRANSVAGINAL US OBSTETRIC: CPT | Mod: ZF

## 2017-08-28 PROCEDURE — 86850 RBC ANTIBODY SCREEN: CPT | Performed by: ADVANCED PRACTICE MIDWIFE

## 2017-08-28 PROCEDURE — 84460 ALANINE AMINO (ALT) (SGPT): CPT | Performed by: ADVANCED PRACTICE MIDWIFE

## 2017-08-28 PROCEDURE — 83021 HEMOGLOBIN CHROMOTOGRAPHY: CPT | Performed by: ADVANCED PRACTICE MIDWIFE

## 2017-08-28 PROCEDURE — 36415 COLL VENOUS BLD VENIPUNCTURE: CPT | Performed by: ADVANCED PRACTICE MIDWIFE

## 2017-08-28 RX ORDER — PYRIDOXINE HCL (VITAMIN B6) 25 MG
25 TABLET ORAL 3 TIMES DAILY
Qty: 60 TABLET | Refills: 1 | Status: SHIPPED | OUTPATIENT
Start: 2017-08-28 | End: 2018-01-15

## 2017-08-28 RX ORDER — PRENATAL VIT/IRON FUM/FOLIC AC 27MG-0.8MG
1 TABLET ORAL DAILY
COMMUNITY

## 2017-08-28 ASSESSMENT — PAIN SCALES - GENERAL: PAINLEVEL: NO PAIN (0)

## 2017-08-28 NOTE — PATIENT INSTRUCTIONS
"  Thank you for choosing [facility] for your obstetrical care.  We are an integrated health clinic with obstetricians, midwives, a psychologist, an acupuncturist, a nutritionist, a pharmacist, internal medicine and family practice all in one.  If you have questions about services offered please ask.      o Please keep all appointments with your provider.  You will help catch, prevent and treat problems early.  o Review your Expectant Family booklet and folder given to you at this intake visit.  It can answer many basic questions including:    Treatment for nausea and vomiting    Medications that are safe in pregnancy    Healthy diet and weight gain    Exercise and activity  o ASK questions!!  Please use \"Questions for my New OB visit\" form to write down any questions or concerns for your next visit.  o Eat a healthy diet.  Visit www.choosemyplate.gov and click on  Pregnancy and Breastfeeding  for information and tips  o Do not smoke.  Avoid other people's smoke, too.  We are happy to help with referrals to stop smoking programs.  o Do not drink alcohol.  o Try to avoid people who have colds or other infections.  Practice good hand washing.  o Consider registering for our Healthy Pregnancy Class here at [facility].  This class is offered every 3rd Wednesday from 2:30-4:30 p.m.  [place] at [phone] or online at [email or web address] or [email or web address]  o Consider registering for prenatal education classes through [place] at [facility].  You can view class schedules and register online at [email or web address] or call [phone] for questions     For urgent concerns, call [facility] at [phone] to speak with a triage nurse during regular clinic hours (8:00 am - 4:30 pm).  This line is answered by our service 24 hours a day, after hours a provider will return your call.  Thank you for choosing [facility] ([facility] for your " "obstetrical care.  We are an integrated health clinic with obstetricians, midwives, a psychologist, an acupuncturist, a nutritionist, a pharmacist, internal medicine and family practice all in one.  If you have questions about services offered please ask.      o Please keep all appointments with your provider.  You will help catch, prevent and treat problems early.  o Review your Expectant Family booklet and folder given to you at this intake visit.  It can answer many basic questions including:    Treatment for nausea and vomiting    Medications that are safe in pregnancy    Healthy diet and weight gain    Exercise and activity  o ASK questions!!  Please use \"Questions for my New OB visit\" form to write down any questions or concerns for your next visit.  o Eat a healthy diet.  Visit www.choosemyplate.gov and click on  Pregnancy and Breastfeeding  for information and tips  o Do not smoke.  Avoid other people's smoke, too.  We are happy to help with referrals to stop smoking programs.  o Do not drink alcohol.  o Try to avoid people who have colds or other infections.  Practice good hand washing.  o Consider registering for our Healthy Pregnancy Class here at [facility].  This class is offered every 3rd Wednesday from 2:30-4:30 p.m.  [place] at [phone] or online at [email or web address] or [email or web address]  o Consider registering for prenatal education classes through [place] at [facility].  You can view class schedules and register online at [email or web address] or call [phone] for questions     For urgent concerns, call [facility] at [phone] to speak with a triage nurse during regular clinic hours (8:00 am - 4:30 pm).  This line is answered by our service 24 hours a day, after hours a provider will return your call.  "

## 2017-08-28 NOTE — PROGRESS NOTES
Subjective   31 year old female who presents to clinic with  INDIGO for initiation of OB care.   at 8w5d with estimated date of delivery of 2018 based on US confirms.  Pregnancy is unplanned but welcomed.  Symptoms since LMP include nausea.  Patient has tried these relief measures: diet modification.    Co-morbids: HSV, last outbreak several years ago. Hx pre-e with first pregnancy.   Genetics: No risk factors, declines testing.   Medications: Zoloft 100mg, was taking buproprion but weaned off per the advice of her PCP. Feeling stable on Zoloft. Difficulty tolerating PNV - will try gummy.     Caffeine intake: stopped during pregnancy.   Tobacco, EtOH, Drug use: none.   Zika Exposure: [place] .   R/f Toxoplasmosis exposure: no.  R/f CMV exposure: no kids in .   Dietary restrictions: uses weight watchers.     Reviewed dating ultrasound.       PERSONAL/SOCIAL HISTORY   to INDIGO, who is supportive  Lives lives with their spouse, her 2 children and 3 additional kids (INDIGO's from previous relationship at home (INDIGO has 2 more kids that are frown and out of the house). This is baby #8 between the 2 of them.   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  Additional items: Has been given information regarding WIC. Plans to apply.    Objective  -VS: reviewed and within normal limits   -General appearance: no acute distress, patient is comfortable   NEUROLOGICAL/PSYCHIATRIC   - Orientated x3,   -Mood and affect: : normal   Genetic/Infection questionnaire completed, risks include: none    Assessment/Plan   at 8w5d  by Patient's last menstrual period was 2017 (exact date). and US     Encounter Diagnoses   Name Primary?     Supervision of other normal pregnancy, antepartum Yes     Nausea      Screening for genetic disease carrier status      Recurrent major depressive disorder, in partial remission (H)      History of herpes  genitalis      Orders Placed This Encounter   Procedures     25- OH-Vitamin D     CBC with Platelets Differential [NRA599]     Anti Treponema [IAZ2625]     Rubella Antibody IgG Quantitative [UIA6887]     Hepatitis B Surface Antigen [MWC458]     HIV Antigen Antibody Combo [HCD1003]     HGB Eval Reflex to ELP or RBC Solubility     AST [BGB903]     ALT [ULV298]     Basic Metabolic Panel [LAB 15]     Uric Acid [EPV156]     Protein  random urine with Creat Ratio     Creatinine urine calculation only     Maternal Fetal Medicine Center Referral [9046.022]     ABO/Rh Type and Screen [MTJ594]     Orders Placed This Encounter   Medications     pyridOXINE (VITAMIN B-6) 25 MG tablet     Sig: Take 1 tablet (25 mg) by mouth 3 times daily     Dispense:  60 tablet     Refill:  1     doxylamine (UNISOM) 25 MG TABS tablet     Sig: Take 1 tablet (25 mg) by mouth At Bedtime     Dispense:  30 each     Refill:  1     -Questions r/t using weight watchers during pregnancy answered. Discussed healthy diet, exercise and weight gain this pregnancy.   - Oriented to Practice, types of care, and how to reach a provider. Okay with midwifery care.   - Patient received 1st trimester new OB education packet complete with aide of The Expectant Family booklet including information on genetic screening test options.  - Patient desires level II ultrasound which was ordered. Declines early genetic testing.  - Patient was encouraged to start prenatal vitamins as tolerated.    - Patient was sent to lab for routine OB labs including Hg electrophoresis, pre-e labs.   - Patient instructed to schedule new OB exam with provider in 1-2 weeks.  - Pregnancy concerns to be addressed by provider at new OB exam include: previous C/S x1 and history of preeclampsia. Last pap NIL 11/2015.     Pt to RTO for NOB visit in 2 weeks and prn if questions or concerns    Steff Hernandez, MART BRENNAN

## 2017-08-28 NOTE — MR AVS SNAPSHOT
"              After Visit Summary   8/28/2017    Vincent Burris    MRN: [id number]           Patient Information     Date Of Birth          1985        Visit Information        Provider Department      8/28/2017 8:45 AM Steff Hernandez APRN CNM [facility]        Today's Diagnoses     Supervision of other normal pregnancy, antepartum    -  1    Nausea        Screening for genetic disease carrier status        Recurrent major depressive disorder, in partial remission (H)        History of herpes genitalis          Care Instructions      Thank you for choosing [facility] for your [facility]etrical care.  We are an integrated health clinic with obstetricians, midwives, a psychologist, an acupuncturist, a nutritionist, a pharmacist, internal medicine and family practice all in one.  If you have questions about services offered please ask.      o Please keep all appointments with your provider.  You will help catch, prevent and treat problems early.  o Review your Expectant Family booklet and folder given to you at this intake visit.  It can answer many basic questions including:    Treatment for nausea and vomiting    Medications that are safe in pregnancy    Healthy diet and weight gain    Exercise and activity  o ASK questions!!  Please use \"Questions for my New OB visit\" form to write down any questions or concerns for your next visit.  o Eat a healthy diet.  Visit www.choosemyplate.gov and click on  Pregnancy and Breastfeeding  for information and tips  o Do not smoke.  Avoid other people's smoke, too.  We are happy to help with referrals to stop smoking programs.  o Do not drink alcohol.  o Try to avoid people who have colds or other infections.  Practice good hand washing.  o Consider registering for our Healthy Pregnancy Class here at [facility].  This class is offered every 3rd Wednesday from 2:30-4:30 p.m.  [place] at [phone] or online at " "[email or web address] or [email or web address]  o Consider registering for prenatal education classes through [place] at [facility].  You can view class schedules and register online at [email or web address] or call [phone] for questions     For urgent concerns, call [facility] at [phone] to speak with a triage nurse during regular clinic hours (8:00 am - 4:30 pm).  This line is answered by our service 24 hours a day, after hours a provider will return your call.  Thank you for choosing [facility] for your obstetrical care.  We are an integrated health clinic with obstetricians, midwives, a psychologist, an acupuncturist, a nutritionist, a pharmacist, internal medicine and family practice all in one.  If you have questions about services offered please ask.      o Please keep all appointments with your provider.  You will help catch, prevent and treat problems early.  o Review your Expectant Family booklet and folder given to you at this intake visit.  It can answer many basic questions including:    Treatment for nausea and vomiting    Medications that are safe in pregnancy    Healthy diet and weight gain    Exercise and activity  o ASK questions!!  Please use \"Questions for my New OB visit\" form to write down any questions or concerns for your next visit.  o Eat a healthy diet.  Visit www.choosemyplate.gov and click on  Pregnancy and Breastfeeding  for information and tips  o Do not smoke.  Avoid other people's smoke, too.  We are happy to help with referrals to stop smoking programs.  o Do not drink alcohol.  o Try to avoid people who have colds or other infections.  Practice good hand washing.  o Consider registering for our Healthy Pregnancy Class here at [facility].  This class is offered every 3rd Wednesday from 2:30-4:30 p.m.  [place] at [phone] or online at [email or web address] or " [email or web address]  o Consider registering for prenatal education classes through [place] at [facility].  You can view class schedules and register online at [email or web address] or call [phone] for questions     For urgent concerns, call [facility] at [phone] to speak with a triage nurse during regular clinic hours (8:00 am - 4:30 pm).  This line is answered by our service 24 hours a day, after hours a provider will return your call.          Follow-ups after your visit        Additional Services     Maternal Fetal Medicine Center Referral [9046.022]       >> Patient may proceed with recommendations for further testing as directed by the Maternal Fetal Medicine Specialist >>    >> If requesting Fetal Echo: MFM will determine appropriate location for exam due to indication.    >> If requesting Lung Maturity Amnio:  If results indicate fetal lung maturity, induction or C/S is recommended within 36 hours.  Please schedule accordingly.     Dear Patient:   Please be aware that coverage of these services is subject to the terms and limitations of your health insurance plan.  Call member services at your health plan with any benefit or coverage questions.      Please bring the following to your appointment:    >>  Any x-rays, CTs or MRIs which have been performed.  Contact the facility where they were done to arrange for  prior to your scheduled appointment.  Any new CT, MRI or other procedures ordered by your specialist must be performed at a [place] facility or coordinated by your clinic's referral office.  >>  List of current medications   >>  This referral request   >>  Any documents/labs given to you for this referral                  Follow-up notes from your care team     Return in about 2 weeks (around 9/11/2017) for next OB visit, NOB.      Your next 10 appointments already scheduled     Sep 12, 2017  9:00 AM CDT   NEW OB with MART Alexander CNM    [facility] ([facility])    [facility] [address]  [address]  [facility] [place] [address] [phone]              Future tests that were ordered for you today     Open Future Orders        Priority Expected Expires Ordered    [facility] Comprehensive Single Routine  6/28/2018 8/28/2017            Who to contact     Please call your clinic at [phone] to:    Ask questions about your health    Make or cancel appointments    Discuss your medicines    Learn about your test results    Speak to your doctor   If you have compliments or concerns about an experience at your clinic, or if you wish to file a complaint, please contact [facility] Patient Relations at [phone] or email us at [email or web address]         Additional Information About Your Visit        [place] Information     iota Computingt gives you secure access to your electronic health record. If you see a primary care provider, you can also send messages to your care team and make appointments. If you have questions, please call your primary care clinic.  If you do not have a primary care provider, please call [phone] and they will assist you.      [employer] is an electronic gateway that provides easy, online access to your medical records. With [employer], you can request a clinic appointment, read your test results, renew a prescription or communicate with your care team.     To access your existing account, please contact your [facility] or call [phone] for assistance.        Care EveryWhere ID     This is your Care EveryWhere ID. This could be used by other organizations to access your [place] medical records  [id number]        Your Vitals Were     Last Period                   06/28/2017 (Exact Date)            Blood Pressure from Last 3 Encounters:   08/28/17 114/76   08/12/17 114/76   07/27/17 104/64    Weight from  Last 3 Encounters:   08/28/17 89.9 kg (198 lb 1.6 oz)   08/12/17 88 kg (194 lb)   07/27/17 87.8 kg (193 lb 8 oz)              We Performed the Following     25- OH-Vitamin D     ABO/Rh Type and Screen [ASF492]     ALT [UHS689]     Anti Treponema [HXZ1564]     AST [GNM934]     Basic Metabolic Panel [LAB 15]     CBC with Platelets Differential [NRG097]     Creatinine urine calculation only     Hepatitis B Surface Antigen [PYI333]     HGB Eval Reflex to ELP or RBC Solubility     HIV Antigen Antibody Combo [XVC0842]     Maternal Fetal Medicine Center Referral [9046.022]     Protein  random urine with Creat Ratio     Rubella Antibody IgG Quantitative [OVG8348]     Uric Acid [YVY551]     Urine Culture Aerobic Bacterial [PUH037]          Today's Medication Changes          These changes are accurate as of: 8/28/17 12:09 PM.  If you have any questions, ask your nurse or doctor.               Start taking these medicines.        Dose/Directions    doxylamine 25 MG Tabs tablet   Commonly known as:  UNISOM   Used for:  Nausea   Started by:  Steff Hernandez APRN CNM        Dose:  25 mg   Take 1 tablet (25 mg) by mouth At Bedtime   Quantity:  30 each   Refills:  1       pyridOXINE 25 MG tablet   Commonly known as:  vitamin B-6   Used for:  Nausea   Started by:  Steff Hernandez APRN CNM        Dose:  25 mg   Take 1 tablet (25 mg) by mouth 3 times daily   Quantity:  60 tablet   Refills:  1         Stop taking these medicines if you haven't already. Please contact your care team if you have questions.     buPROPion 150 MG 24 hr tablet   Commonly known as:  WELLBUTRIN XL                Where to get your medicines      These medications were sent to [facility] - [address]  [address] [place] [address] [address]     Phone:  [phone]     doxylamine 25 MG Tabs tablet    pyridOXINE 25 MG tablet                Primary Care Provider Office Phone # Fax #    Xiomara Smith,  [phone]       [address] [place] [address] 7[address]  [facility] [address]        Equal Access to Services     JASVIR HOLLOWAY : Hadii aad ku hadbirgitdemetrius Mcelroy, adolphnaheed scottrobinha, adrianamukesh morrowlelo tabares, marichuy johnniein hayaan yarelyjose david ag lacurlyregis adkins. So [facility] [phone].    ATENCIÓN: Si habla español, tiene a alston disposición servicios gratuitos de asistencia lingüística. Llame al [phone].    We comply with applicable federal civil rights laws and [place] laws. We do not discriminate on the basis of race, color, national origin, age, disability sex, sexual orientation or gender identity.            Thank you!     Thank you for choosing [facility]  for your care. Our goal is always to provide you with excellent care. Hearing back from our patients is one way we can continue to improve our services. Please take a few minutes to complete the written survey that you may receive in the mail after your visit with us. Thank you!             Your Updated Medication List - Protect others around you: Learn how to safely use, store and throw away your medicines at www.disposemymeds.org.          This list is accurate as of: 8/28/17 12:09 PM.  Always use your most recent med list.                   Brand Name Dispense Instructions for use Diagnosis    doxylamine 25 MG Tabs tablet    UNISOM    30 each    Take 1 tablet (25 mg) by mouth At Bedtime    Nausea       fluticasone 50 MCG/ACT spray    FLONASE    1 Bottle    Spray 1 spray into both nostrils daily    Seasonal allergic rhinitis       loratadine 10 MG ODT tab    CLARITIN REDITABS     Take 10 mg by mouth daily        prenatal multivitamin plus iron 27-0.8 MG Tabs per tablet      Take 1 tablet by mouth daily        pyridOXINE 25 MG tablet    vitamin B-6    60 tablet    Take 1 tablet (25 mg) by mouth 3 times daily    Nausea       sertraline 100 MG tablet    ZOLOFT    90 tablet    Take 1 tablet (100 mg) by mouth daily    SOLANGE (generalized anxiety  disorder)

## 2017-08-28 NOTE — NURSING NOTE
Chief Complaint   Patient presents with     Prenatal Care     OB intake 8 weeks and 5 days   Radha Amanda LPN

## 2017-08-28 NOTE — PROGRESS NOTES
31 year old female, , with LMP 2017, 8 5/7 weeks. Estimated Date of Delivery: 2018,  presents for confirmation of dates and assessment of viability. This study was done transvaginally.    Measurements     CRL = 20.9 mm = 8 5/7 weeks  EGA.       Fetal anatomy appears normal for gestational age.     Fetal/Fetal Cardiac Activity: Present.  FHR = 176bpm.     Implantation: Intrauterine.     Cervix = 4.4 cm      Maternal structures appear normal.    Impression: Single viable intrauterine pregnancy at 8w5d by LMP c/w today's scan.  Use EVAN of 18 based on LMP.    Recommend comprehensive scan at 18 to 20 weeks.    STANISLAV Palomares MD

## 2017-08-28 NOTE — LETTER
2017       RE: Vincent Burris  [address] [place] [id number]     Dear Colleague,    Thank you for referring your patient, Vincent Burris, to the [facility] at [facility]. Please see a copy of my visit note below.      Subjective   31 year old female who presents to clinic with  INDIGO for initiation of OB care.   at 8w5d with estimated date of delivery of 2018 based on US confirms.  Pregnancy is unplanned but welcomed.  Symptoms since LMP include nausea.  Patient has tried these relief measures: diet modification.    Co-morbids: HSV, last outbreak several years ago. Hx pre-e with first pregnancy.   Genetics: No risk factors, declines testing.   Medications: Zoloft 100mg, was taking buproprion but weaned off per the advice of her PCP. Feeling stable on Zoloft. Difficulty tolerating PNV - will try gummy.     Caffeine intake: stopped during pregnancy.   Tobacco, EtOH, Drug use: none.   Zika Exposure: [place] .   R/f Toxoplasmosis exposure: no.  R/f CMV exposure: no kids in .   Dietary restrictions: uses weight watchers.     Reviewed dating ultrasound.       PERSONAL/SOCIAL HISTORY   to INDIGO, who is supportive  Lives lives with their spouse, her 2 children and 3 additional kids (INDIGO's from previous relationship at home (INDIGO has 2 more kids that are frown and out of the house). This is baby #8 between the 2 of them.   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  Additional items: Has been given information regarding WIC. Plans to apply.    Objective  -VS: reviewed and within normal limits   -General appearance: no acute distress, patient is comfortable   NEUROLOGICAL/PSYCHIATRIC   - Orientated x3,   -Mood and affect: : normal   Genetic/Infection questionnaire completed, risks include: none    Assessment/Plan   at 8w5d  by Patient's last  menstrual period was 06/28/2017 (exact date). and US     Encounter Diagnoses   Name Primary?     Supervision of other normal pregnancy, antepartum Yes     Nausea      Screening for genetic disease carrier status      Recurrent major depressive disorder, in partial remission (H)      History of herpes genitalis      Orders Placed This Encounter   Procedures     25- [place]-Vitamin D     CBC with Platelets Differential [IZY583]     Anti Treponema [IDA4808]     Rubella Antibody IgG Quantitative [AIS7005]     Hepatitis B Surface Antigen [UTX907]     HIV Antigen Antibody Combo [EEH5575]     HGB Eval Reflex to ELP or RBC Solubility     AST [SID040]     ALT [LVR081]     Basic Metabolic Panel [LAB 15]     Uric Acid [XXV753]     Protein  random urine with Creat Ratio     Creatinine urine calculation only     [facility] Referral [9046.022]     ABO/Rh Type and Screen [MCB820]     Orders Placed This Encounter   Medications     pyridOXINE (VITAMIN B-6) 25 MG tablet     Sig: Take 1 tablet (25 mg) by mouth 3 times daily     Dispense:  60 tablet     Refill:  1     doxylamine (UNISOM) 25 MG TABS tablet     Sig: Take 1 tablet (25 mg) by mouth At Bedtime     Dispense:  30 each     Refill:  1     -Questions r/t using weight watchers during pregnancy answered. Discussed healthy diet, exercise and weight gain this pregnancy.   - Oriented to Practice, types of care, and how to reach a provider. Okay with midwifery care.   - Patient received 1st trimester new OB education packet complete with aide of The Expectant Family booklet including information on genetic screening test options.  - Patient desires level II ultrasound which was ordered. Declines early genetic testing.  - Patient was encouraged to start prenatal vitamins as tolerated.    - Patient was sent to lab for routine OB labs including Hg electrophoresis, pre-e labs.   - Patient instructed to schedule new OB exam with provider in 1-2 weeks.  - Pregnancy concerns  to be addressed by provider at new OB exam include: previous C/S x1 and history of preeclampsia. Last pap NIL 11/2015.     Pt to RTO for NOB visit in 2 weeks and prn if questions or concerns    MART Moreno CNM

## 2017-08-28 NOTE — LETTER
2017       RE: Vincent Burris  [address] [place] [address]     Dear Colleague,    Thank you for referring your patient, Vincent Burris, to the [facility] at [facility]. Please see a copy of my visit note below.    31 year old female, , with LMP 2017, 8 5/7 weeks. Estimated Date of Delivery: 2018,  presents for confirmation of dates and assessment of viability. This study was done transvaginally.    Measurements     CRL = 20.9 mm = 8 5/7 weeks  EGA.       Fetal anatomy appears normal for gestational age.     Fetal/Fetal Cardiac Activity: Present.  FHR = 176bpm.     Implantation: Intrauterine.     Cervix = 4.4 cm      Maternal structures appear normal.    Impression: Single viable intrauterine pregnancy at 8w5d by LMP c/w today's scan.  Use EVAN of 18 based on LMP.    Recommend comprehensive scan at 18 to 20 weeks.    STANISLAV Palomares MD    Again, thank you for allowing me to participate in the care of your patient.      Sincerely,    [facility]ound

## 2017-08-29 DIAGNOSIS — E55.9 VITAMIN D DEFICIENCY: Primary | ICD-10-CM

## 2017-08-29 LAB
BACTERIA SPEC CULT: NO GROWTH
HGB A1 MFR BLD: 97.1 % (ref 95–97.9)
HGB A2 MFR BLD: 2.7 % (ref 2–3.5)
HGB C MFR BLD: 0 % (ref 0–0)
HGB E MFR BLD: 0 % (ref 0–0)
HGB F MFR BLD: 0.2 % (ref 0–2.1)
HGB FRACT BLD ELPH-IMP: NORMAL
HGB OTHER MFR BLD: 0 % (ref 0–0)
HGB S BLD QL SOLY: NORMAL
HGB S MFR BLD: 0 % (ref 0–0)
Lab: NORMAL
PATH INTERP BLD-IMP: NORMAL
SPECIMEN SOURCE: NORMAL

## 2017-09-05 ENCOUNTER — TELEPHONE (OUTPATIENT)
Dept: OBGYN | Facility: CLINIC | Age: 32
End: 2017-09-05

## 2017-09-05 ENCOUNTER — OFFICE VISIT (OUTPATIENT)
Dept: URGENT CARE | Facility: URGENT CARE | Age: 32
End: 2017-09-05
Payer: COMMERCIAL

## 2017-09-05 VITALS
OXYGEN SATURATION: 100 % | DIASTOLIC BLOOD PRESSURE: 77 MMHG | TEMPERATURE: 97.3 F | SYSTOLIC BLOOD PRESSURE: 129 MMHG | BODY MASS INDEX: 32.46 KG/M2 | HEART RATE: 91 BPM | WEIGHT: 201 LBS

## 2017-09-05 DIAGNOSIS — J30.1 ACUTE SEASONAL ALLERGIC RHINITIS DUE TO POLLEN: ICD-10-CM

## 2017-09-05 DIAGNOSIS — J01.90 ACUTE SINUSITIS WITH SYMPTOMS > 10 DAYS: Primary | ICD-10-CM

## 2017-09-05 DIAGNOSIS — Z3A.10 10 WEEKS GESTATION OF PREGNANCY: ICD-10-CM

## 2017-09-05 PROCEDURE — 99214 OFFICE O/P EST MOD 30 MIN: CPT | Performed by: PHYSICIAN ASSISTANT

## 2017-09-05 RX ORDER — LORATADINE 10 MG/1
10 TABLET ORAL DAILY
Qty: 30 TABLET | Refills: 1
Start: 2017-09-05

## 2017-09-05 RX ORDER — AMOXICILLIN 875 MG
875 TABLET ORAL 2 TIMES DAILY
Qty: 20 TABLET | Refills: 0 | Status: SHIPPED | OUTPATIENT
Start: 2017-09-05 | End: 2017-09-15

## 2017-09-05 NOTE — TELEPHONE ENCOUNTER
Spoke to Vincent who is 9 weeks pregnant and was seen at urgent care dx with sinus infection and wondering if ok to use afrin nasal spray.  Informed her ok to use in pregnancy but no more than 3 days. She also was prescribed an antibiotic. Instructed her to take antibiotic until gone,even if feeling better.Pt indicated understanding and agreed with plan.

## 2017-09-05 NOTE — MR AVS SNAPSHOT
After Visit Summary   9/5/2017    Vincent Burris    MRN: [id number]           Patient Information     Date Of Birth          1985        Visit Information        Provider Department      9/5/2017 9:20 AM Gilmar Wolf PA-C [facility]        Today's Diagnoses     Acute sinusitis with symptoms > 10 days    -  1    Acute seasonal allergic rhinitis due to pollen        10 weeks gestation of pregnancy           Follow-ups after your visit        Your next 10 appointments already scheduled     Sep 12, 2017  9:00 AM CDT   NEW OB with MART Alexander CNM   [facility] ([facility])    [facility]  [address]  [address]  [facility] [address] [phone]              Who to contact     If you have questions or need follow up information about today's clinic visit or your schedule please contact [facility] directly at [phone].  Normal or non-critical lab and imaging results will be communicated to you by PayScalehart, letter or phone within 4 business days after the clinic has received the results. If you do not hear from us within 7 days, please contact the clinic through NEWLINE SOFTWAREt or phone. If you have a critical or abnormal lab result, we will notify you by phone as soon as possible.  Submit refill requests through Kogeto or call your pharmacy and they will forward the refill request to us. Please allow 3 business days for your refill to be completed.          Additional Information About Your Visit        PayScalehart Information     K[employer]eto gives you secure access to your electronic health record. If you see a primary care provider, you can also send messages to your care team and make appointments. If you have questions, please call your primary care clinic.  If you do not have a primary care provider, please call [phone] and they will assist you.        Care EveryWhere  ID     This is your Care EveryWhere ID. This could be used by other organizations to access your [facility] medical records  [id number]        Your Vitals Were     Pulse Temperature Last Period Pulse Oximetry BMI (Body Mass Index)       91 97.3  F (36.3  C) (Oral) 06/28/2017 (Exact Date) 100% 32.46 kg/m2        Blood Pressure from Last 3 Encounters:   09/05/17 129/77   08/28/17 114/76   08/12/17 114/76    Weight from Last 3 Encounters:   09/05/17 201 lb (91.2 kg)   08/28/17 198 lb 1.6 oz (89.9 kg)   08/12/17 194 lb (88 kg)              Today, you had the following     No orders found for display         Today's Medication Changes          These changes are accurate as of: 9/5/17  9:47 AM.  If you have any questions, ask your nurse or doctor.               Start taking these medicines.        Dose/Directions    amoxicillin 875 MG tablet   Commonly known as:  AMOXIL   Used for:  Acute sinusitis with symptoms > 10 days   Started by:  Gilmar Wolf PA-C        Dose:  875 mg   Take 1 tablet (875 mg) by mouth 2 times daily for 10 days   Quantity:  20 tablet   Refills:  0         These medicines have changed or have updated prescriptions.        Dose/Directions    * loratadine 10 MG ODT tab   Commonly known as:  CLARITIN REDITABS   This may have changed:  Another medication with the same name was added. Make sure you understand how and when to take each.   Changed by:  Em Black MD        Dose:  10 mg   Take 10 mg by mouth daily   Refills:  0       * loratadine 10 MG tablet   Commonly known as:  CLARITIN   This may have changed:  You were already taking a medication with the same name, and this prescription was added. Make sure you understand how and when to take each.   Used for:  Acute seasonal allergic rhinitis due to pollen   Changed by:  Gilmar Wolf PA-C        Dose:  10 mg   Take 1 tablet (10 mg) by mouth daily   Quantity:  30 tablet   Refills:  1       * Notice:  This list has 2 medication(s) that are  the same as other medications prescribed for you. Read the directions carefully, and ask your doctor or other care provider to review them with you.         Where to get your medicines      These medications were sent to [facility] [place], [place] - [address].  [address], [facility] [address]     Phone:  [phone]     amoxicillin 875 MG tablet         Some of these will need a paper prescription and others can be bought over the counter.  Ask your nurse if you have questions.     You don't need a prescription for these medications     loratadine 10 MG tablet                Primary Care Provider Office Phone # Fax #    Xiomara Mariela Smith [phone]       [address] [place] [address] 741  [facility] [address]        Equal Access to Services     JASVIR HOLLOWAY : Boris ruffin hadasho Soomaali, waaxda luqadaha, qaybta kaalmada adeegyada, marichuy adkins. So [facility] [phone].    ATENCIÓN: Si habla español, tiene a alston disposición servicios gratuitos de asistencia lingüística. Llame al [phone].    We comply with applicable federal civil rights laws and [place] laws. We do not discriminate on the basis of race, color, national origin, age, disability sex, sexual orientation or gender identity.            Thank you!     Thank you for choosing [facility]  for your care. Our goal is always to provide you with excellent care. Hearing back from our patients is one way we can continue to improve our services. Please take a few minutes to complete the written survey that you may receive in the mail after your visit with us. Thank you!             Your Updated Medication List - Protect others around you: Learn how to safely use, store and throw away your medicines at www.disposemymeds.org.          This list is accurate as of: 9/5/17  9:47 AM.  Always use your most recent med list.                   Brand Name Dispense  Instructions for use Diagnosis    amoxicillin 875 MG tablet    AMOXIL    20 tablet    Take 1 tablet (875 mg) by mouth 2 times daily for 10 days    Acute sinusitis with symptoms > 10 days       cholecalciferol 5000 UNITS Caps capsule    vitamin D3    90 capsule    Take 1 capsule (5,000 Units) by mouth daily Take one capsule daily.    Vitamin D deficiency       doxylamine 25 MG Tabs tablet    UNISOM    30 each    Take 1 tablet (25 mg) by mouth At Bedtime    Nausea       fluticasone 50 MCG/ACT spray    FLONASE    1 Bottle    Spray 1 spray into both nostrils daily    Seasonal allergic rhinitis       * loratadine 10 MG ODT tab    CLARITIN REDITABS     Take 10 mg by mouth daily        * loratadine 10 MG tablet    CLARITIN    30 tablet    Take 1 tablet (10 mg) by mouth daily    Acute seasonal allergic rhinitis due to pollen       prenatal multivitamin plus iron 27-0.8 MG Tabs per tablet      Take 1 tablet by mouth daily        pyridOXINE 25 MG tablet    vitamin B-6    60 tablet    Take 1 tablet (25 mg) by mouth 3 times daily    Nausea       sertraline 100 MG tablet    ZOLOFT    90 tablet    Take 1 tablet (100 mg) by mouth daily    SOLANGE (generalized anxiety disorder)       * Notice:  This list has 2 medication(s) that are the same as other medications prescribed for you. Read the directions carefully, and ask your doctor or other care provider to review them with you.

## 2017-09-05 NOTE — PROGRESS NOTES
SUBJECTIVE:  Vincent Burris is a 31 year old female here with concerns about sinus infection.  She states onset of symptoms were 1 week(s) ago.  She has had maxillary, frontal pressure. Course of illness is worsening. Severity moderate  Current and Associated symptoms: nasal congestion, rhinorrhea, facial pain/pressure and post-nasal drainage  Predisposing factors include recent illness and pregnancy. Recent treatment has included: OTC meds    Past Medical History:   Diagnosis Date     Atypical chest pain 2013    ekg and workup normla in er     BMI 32.0-32.9,adult 7/30/2017     Dental abscess 2013    seen in [place] for denta lpain and had a periapical abscess given antibiotics, pain meds and a dental block     Former smoker      SOLANGE (generalized anxiety disorder) 6/15/2016     Hidradenitis suppurativa      History of herpes genitalis      LGSIL on Pap smear of cervix 2005     Recurrent major depressive disorder, in partial remission (H)     on lexapro in past , then sertraline , later well butrin added      Social History   Substance Use Topics     Smoking status: Former Smoker     Packs/day: 0.10     Years: 8.00     Types: Cigarettes     Quit date: 1/1/2011     Smokeless tobacco: Never Used     Alcohol use No      Comment: stopped for pregnancy , Drinks 3-5 drinks 1-2 nights per weekend       ROS:  CONSTITUTIONAL:NEGATIVE for fever, chills, change in weight  INTEGUMENTARY/SKIN: NEGATIVE for worrisome rashes, moles or lesions  ENT/MOUTH: POSITIVE for sinus congestion, drainage, sinus pain  RESP:POSITIVE for cough-non productive  CV: NEGATIVE for chest pain, palpitations or peripheral edema  GI: NEGATIVE for nausea, abdominal pain, heartburn, or change in bowel habits  MUSCULOSKELETAL: NEGATIVE for significant arthralgias or myalgia  NEURO: NEGATIVE for weakness, dizziness or paresthesias    OBJECTIVE:  /77  Pulse 91  Temp 97.3  F (36.3  C) (Oral)  Wt 201 lb (91.2 kg)  LMP 06/28/2017 (Exact Date)  SpO2  100%  BMI 32.46 kg/m2  Exam:GENERAL APPEARANCE: healthy, alert and no distress  EYES: EOMI,  PERRL, conjunctiva clear  HENT: TM's normal bilaterally, nasal turbinates erythematous, swollen, rhinorrhea purulent, frontal sinus tenderness  and maxillary sinus tenderness   NECK: supple, nontender, no lymphadenopathy  RESP: lungs clear to auscultation - no rales, rhonchi or wheezes  CV: regular rates and rhythm, normal S1 S2, no murmur noted  NEURO: Normal strength and tone, sensory exam grossly normal,  normal speech and mentation  SKIN: no suspicious lesions or rashes    ASSESSMENT/PLAN:    ICD-10-CM    1. Acute sinusitis with symptoms > 10 days J01.90 amoxicillin (AMOXIL) 875 MG tablet   2. Acute seasonal allergic rhinitis due to pollen J30.1 loratadine (CLARITIN) 10 MG tablet   3. 10 weeks gestation of pregnancy Z3A.10        Fluids, rest  Discuss with OB about use of afrin nasal spray  Follow up with primary clinic if not improving

## 2017-09-08 ENCOUNTER — OFFICE VISIT (OUTPATIENT)
Dept: URGENT CARE | Facility: URGENT CARE | Age: 32
End: 2017-09-08
Payer: COMMERCIAL

## 2017-09-08 VITALS
WEIGHT: 203 LBS | HEART RATE: 81 BPM | BODY MASS INDEX: 32.78 KG/M2 | DIASTOLIC BLOOD PRESSURE: 67 MMHG | OXYGEN SATURATION: 98 % | SYSTOLIC BLOOD PRESSURE: 111 MMHG | TEMPERATURE: 97.9 F

## 2017-09-08 DIAGNOSIS — A60.00 GENITAL HERPES SIMPLEX, UNSPECIFIED SITE: Primary | ICD-10-CM

## 2017-09-08 PROCEDURE — 99213 OFFICE O/P EST LOW 20 MIN: CPT

## 2017-09-08 RX ORDER — VALACYCLOVIR HYDROCHLORIDE 500 MG/1
500 TABLET, FILM COATED ORAL 2 TIMES DAILY
Qty: 6 TABLET | Refills: 3 | Status: SHIPPED | OUTPATIENT
Start: 2017-09-08 | End: 2017-09-12

## 2017-09-08 NOTE — PROGRESS NOTES
"SUBJECTIVE:  Vincent Burris is a pleasant 30 y/o female currently 10 weeks gestation presents to the clinic for evaluation of vaginal lesions. The patient notes a history of genital herpes, most recent outbreak 1 year ago. Notes she recently had waxing completed and has noted 3-4 lesions located to the vaginal area present. She notes they are red and painful. Feels similar to previous HSV infection. No fever, nausea, vomiting, or discharge. No treatments tried.    Past Medical History:   Diagnosis Date     Atypical chest pain     ekg and workup normla in er     BMI 32.0-32.9,adult 2017     Dental abscess 2013    seen in [place] for denta lpain and had a periapical abscess given antibiotics, pain meds and a dental block     Former smoker      SOLANGE (generalized anxiety disorder) 6/15/2016     Hidradenitis suppurativa      History of herpes genitalis      LGSIL on Pap smear of cervix      Recurrent major depressive disorder, in partial remission (H)     on lexapro in past , then sertraline , later well butrin added      Past Surgical History:   Procedure Laterality Date     APPENDECTOMY       C/SECTION, LOW TRANSVERSE  10/31/ 2005    \"Elaina \"     COLPOSCOPY, BIOPSY, COMBINED  2005    + pap, bx 12 & 6 o clock,ECC: FAYE I     DILATION AND CURETTAGE SUCTION, TREAT INCOMPLETE   2009     VAG DELIV ONLY,PREV C-SECTN      \" Pedro\"     Social History     Social History     Marital status: Single     Spouse name: N/A     Number of children: N/A     Years of education: N/A     Occupational History     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 [employer]      Social History Main Topics     Smoking status: Former Smoker     Packs/day: 0.10     Years: 8.00     Types: Cigarettes     Quit date: 2011     Smokeless tobacco: Never Used     Alcohol use No      Comment: stopped for pregnancy , Drinks 3-5 drinks 1-2 nights per weekend     Drug use: No     Sexual activity: Yes     Partners: Male     Other Topics Concern "     Not on file     Social History Narrative    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      Family History   Problem Relation Age of Onset     Breast Cancer Maternal Grandmother      DIABETES Maternal Grandfather      KIDNEY DISEASE Paternal Grandmother      Skin Cancer No family hx of      Melanoma No family hx of      Hypertension No family hx of      Coronary Artery Disease No family hx of      CEREBROVASCULAR DISEASE No family hx of      Other Cancer No family hx of           OBJECTIVE:  /67  Pulse 81  Temp 97.9  F (36.6  C) (Oral)  Wt 203 lb (92.1 kg)  LMP 2017 (Exact Date)  SpO2 98%  BMI 32.78 kg/m2  Physical Exam   Constitutional: She is well-developed, well-nourished, and in no distress. No distress.   HENT:   Head: Normocephalic and atraumatic.   Eyes: Pupils are equal, round, and reactive to light.   Cardiovascular: Normal rate and regular rhythm.    Pulmonary/Chest: Effort normal.   Genitourinary: Vulva exhibits erythema and rash.   Genitourinary Comments: Chaperoned by CMA. 3 Erythematous papules located to the vulva. Painful to palpation.     Skin: She is not diaphoretic.         ASSESSMENT/PLAN:  Vincent was seen today for urgent care and vaginal problem.    Diagnoses and all orders for this visit:    Genital herpes simplex, unspecified site  -     valACYclovir (VALTREX) 500 MG tablet; Take 1 tablet (500 mg) by mouth 2 times daily      Patient's presentation is consistent with genital herpes. This is a recurrent infection. Patient is being started on Valacyclovir. This is safe in pregnancy. Ibuprofen for pain. Topical lidocaine can be trailed. Repeat an additional 3 days if no significant improvement.  RTC after 5 days if no improvement. Educated on red-flags and avoiding sexual  activity until symptoms have resolved for 1 week.    Donovan Land PA-C

## 2017-09-08 NOTE — NURSING NOTE
"Chief Complaint   Patient presents with     Urgent Care     Vaginal Problem     4 bumps on vaginal area/just got waxed on saturday morning or herpes outbreak?       Initial /67  Pulse 81  Temp 97.9  F (36.6  C) (Oral)  Wt 203 lb (92.1 kg)  LMP 06/28/2017 (Exact Date)  SpO2 98%  BMI 32.78 kg/m2 Estimated body mass index is 32.78 kg/(m^2) as calculated from the following:    Height as of 8/28/17: 5' 5.98\" (1.676 m).    Weight as of this encounter: 203 lb (92.1 kg).  Medication Reconciliation: complete   Kari MATT MA       "

## 2017-09-08 NOTE — MR AVS SNAPSHOT
After Visit Summary   9/8/2017    Vincent Burris    MRN: [id number]           Patient Information     Date Of Birth          1985        Visit Information        Provider Department      9/8/2017 5:20 PM [facility] URGENT CARE [facility]        Today's Diagnoses     Genital herpes simplex, unspecified site    -  1       Follow-ups after your visit        Your next 10 appointments already scheduled     Sep 12, 2017  9:00 AM CDT   NEW OB with MART Alexander CNM   [facility] ([facility])    [facility]  [address]  [address]  [facility] [address] [phone]              Who to contact     If you have questions or need follow up information about today's clinic visit or your schedule please contact [facility] URGENT CARE directly at [phone].  Normal or non-critical lab and imaging results will be communicated to you by MyChart, letter or phone within 4 business days after the clinic has received the results. If you do not hear from us within 7 days, please contact the clinic through [employer] or phone. If you have a critical or abnormal lab result, we will notify you by phone as soon as possible.  Submit refill requests through [employer] or call your pharmacy and they will forward the refill request to us. Please allow 3 business days for your refill to be completed.          Additional Information About Your Visit        MyChart Information     [employer] gives you secure access to your electronic health record. If you see a primary care provider, you can also send messages to your care team and make appointments. If you have questions, please call your primary care clinic.  If you do not have a primary care provider, please call [phone] and they will assist you.        Care EveryWhere ID     This is your Care EveryWhere ID. This could be used by other organizations to access your [place]  medical records  [id number]        Your Vitals Were     Pulse Temperature Last Period Pulse Oximetry BMI (Body Mass Index)       81 97.9  F (36.6  C) (Oral) 06/28/2017 (Exact Date) 98% 32.78 kg/m2        Blood Pressure from Last 3 Encounters:   09/08/17 111/67   09/05/17 129/77   08/28/17 114/76    Weight from Last 3 Encounters:   09/08/17 203 lb (92.1 kg)   09/05/17 201 lb (91.2 kg)   08/28/17 198 lb 1.6 oz (89.9 kg)              Today, you had the following     No orders found for display         Today's Medication Changes          These changes are accurate as of: 9/8/17  6:14 PM.  If you have any questions, ask your nurse or doctor.               Start taking these medicines.        Dose/Directions    valACYclovir 500 MG tablet   Commonly known as:  VALTREX   Used for:  Genital herpes simplex, unspecified site        Dose:  500 mg   Take 1 tablet (500 mg) by mouth 2 times daily   Quantity:  6 tablet   Refills:  3            Where to get your medicines      These medications were sent to [facility] [place], [place] - [address]  [address] [facility] [address], [facility] [address]     Phone:  [phone]     valACYclovir 500 MG tablet                Primary Care Provider Office Phone # Fax #    Xiomara Mariela Smith [phone]       [address] [place] [address] [address]  [facility] [address]        Equal Access to Services     [facility] JEWEL : Boris moeo Soned, waaxda luqadaha, qaybta kaalmada raysa, waxay ramiro adkins. So [facility] [phone].    ATENCIÓN: Si habla español, tiene a alston disposición servicios gratuitos de asistencia lingüística. Llame al [phone].    We comply with applicable federal civil rights laws and [place] laws. We do not discriminate on the basis of race, color, national origin, age, disability sex, sexual orientation or gender identity.            Thank you!     Thank you for choosing [facility] URGENT CARE  for  your care. Our goal is always to provide you with excellent care. Hearing back from our patients is one way we can continue to improve our services. Please take a few minutes to complete the written survey that you may receive in the mail after your visit with us. Thank you!             Your Updated Medication List - Protect others around you: Learn how to safely use, store and throw away your medicines at www.disposemymeds.org.          This list is accurate as of: 9/8/17  6:14 PM.  Always use your most recent med list.                   Brand Name Dispense Instructions for use Diagnosis    amoxicillin 875 MG tablet    AMOXIL    20 tablet    Take 1 tablet (875 mg) by mouth 2 times daily for 10 days    Acute sinusitis with symptoms > 10 days       cholecalciferol 5000 UNITS Caps capsule    vitamin D3    90 capsule    Take 1 capsule (5,000 Units) by mouth daily Take one capsule daily.    Vitamin D deficiency       doxylamine 25 MG Tabs tablet    UNISOM    30 each    Take 1 tablet (25 mg) by mouth At Bedtime    Nausea       fluticasone 50 MCG/ACT spray    FLONASE    1 Bottle    Spray 1 spray into both nostrils daily    Seasonal allergic rhinitis       * loratadine 10 MG ODT tab    CLARITIN REDITABS     Take 10 mg by mouth daily        * loratadine 10 MG tablet    CLARITIN    30 tablet    Take 1 tablet (10 mg) by mouth daily    Acute seasonal allergic rhinitis due to pollen       prenatal multivitamin plus iron 27-0.8 MG Tabs per tablet      Take 1 tablet by mouth daily        pyridOXINE 25 MG tablet    vitamin B-6    60 tablet    Take 1 tablet (25 mg) by mouth 3 times daily    Nausea       sertraline 100 MG tablet    ZOLOFT    90 tablet    Take 1 tablet (100 mg) by mouth daily    SOLANGE (generalized anxiety disorder)       valACYclovir 500 MG tablet    VALTREX    6 tablet    Take 1 tablet (500 mg) by mouth 2 times daily    Genital herpes simplex, unspecified site       * Notice:  This list has 2 medication(s) that are  the same as other medications prescribed for you. Read the directions carefully, and ask your doctor or other care provider to review them with you.

## 2017-09-12 ENCOUNTER — OFFICE VISIT (OUTPATIENT)
Dept: OBGYN | Facility: CLINIC | Age: 32
End: 2017-09-12
Attending: OBSTETRICS & GYNECOLOGY
Payer: COMMERCIAL

## 2017-09-12 VITALS
HEART RATE: 84 BPM | SYSTOLIC BLOOD PRESSURE: 117 MMHG | WEIGHT: 199.7 LBS | HEIGHT: 66 IN | BODY MASS INDEX: 32.09 KG/M2 | DIASTOLIC BLOOD PRESSURE: 77 MMHG

## 2017-09-12 DIAGNOSIS — Z98.891 HISTORY OF C-SECTION: ICD-10-CM

## 2017-09-12 DIAGNOSIS — Z86.19 HISTORY OF HERPES GENITALIS: ICD-10-CM

## 2017-09-12 DIAGNOSIS — Z34.80 SUPERVISION OF OTHER NORMAL PREGNANCY, ANTEPARTUM: Primary | ICD-10-CM

## 2017-09-12 PROBLEM — E55.9 VITAMIN D DEFICIENCY: Status: ACTIVE | Noted: 2017-09-12

## 2017-09-12 PROCEDURE — 99212 OFFICE O/P EST SF 10 MIN: CPT | Mod: ZF

## 2017-09-12 PROCEDURE — 87591 N.GONORRHOEAE DNA AMP PROB: CPT | Performed by: ADVANCED PRACTICE MIDWIFE

## 2017-09-12 PROCEDURE — 87491 CHLMYD TRACH DNA AMP PROBE: CPT | Performed by: ADVANCED PRACTICE MIDWIFE

## 2017-09-12 ASSESSMENT — PATIENT HEALTH QUESTIONNAIRE - PHQ9: SUM OF ALL RESPONSES TO PHQ QUESTIONS 1-9: 10

## 2017-09-12 NOTE — MR AVS SNAPSHOT
After Visit Summary   2017    Vincent Burris    MRN: [id number]           Patient Information     Date Of Birth          1985        Visit Information        Provider Department      2017 9:00 AM Bob Phan APRN [facility]        Today's Diagnoses     Supervision of other normal pregnancy, antepartum    -  1    History of         History of herpes genitalis           Follow-ups after your visit        Follow-up notes from your care team     Return in about 4 weeks (around 10/10/2017).      Your next 10 appointments already scheduled     Oct 16, 2017  9:00 AM CDT   RETURN OB with Gissel Kirby CNM   [facility] ([email or web address])    [facility] [address]  [address]  [address]  [facility] [address] [phone]              Who to contact     Please call your clinic at [phone] to:    Ask questions about your health    Make or cancel appointments    Discuss your medicines    Learn about your test results    Speak to your doctor   If you have compliments or concerns about an experience at your clinic, or if you wish to file a complaint, please contact [facility] Patient Relations at [phone] or email us at [email or web address]         Additional Information About Your Visit        MyChart Information     [employer] gives you secure access to your electronic health record. If you see a primary care provider, you can also send messages to your care team and make appointments. If you have questions, please call your primary care clinic.  If you do not have a primary care provider, please call [phone] and they will assist you.      [employer] is an electronic gateway that provides easy, online access to your medical records. With [employer], you can request a clinic appointment, read your test results, renew a prescription or communicate  "with your care team.     To access your existing account, please contact your [facility] or call [phone] for assistance.        Care EveryWhere ID     This is your Care EveryWhere ID. This could be used by other organizations to access your [place] medical records  [id number]        Your Vitals Were     Pulse Height Last Period BMI (Body Mass Index)          84 1.676 m (5' 5.98\") 06/28/2017 (Exact Date) 32.25 kg/m2         Blood Pressure from Last 3 Encounters:   09/12/17 117/77   09/08/17 111/67   09/05/17 129/77    Weight from Last 3 Encounters:   09/12/17 90.6 kg (199 lb 11.2 oz)   09/08/17 92.1 kg (203 lb)   09/05/17 91.2 kg (201 lb)              We Performed the Following     Chlamydia by PCR     Gonorrhorea by PCR        Primary Care Provider Office Phone # Fax #    Xiomara Mariela Smith [phone]       [address] [place] [address] [address] [place] [address]        Equal Access to Services     [facility]: Hadii holly ku hadasho Soned, waaxda luqadaha, qaybta kaalmada raysa, marichuy bearden . So [facility] [phone].    ATENCIÓN: Si habla español, tiene a alston disposición servicios gratuitos de asistencia lingüística. Brenna al [phone].    We comply with applicable federal civil rights laws and [place] laws. We do not discriminate on the basis of race, color, national origin, age, disability sex, sexual orientation or gender identity.            Thank you!     Thank you for choosing [facility]  for your care. Our goal is always to provide you with excellent care. Hearing back from our patients is one way we can continue to improve our services. Please take a few minutes to complete the written survey that you may receive in the mail after your visit with us. Thank you!             Your Updated Medication List - Protect others around you: Learn how to safely use, store and throw away your medicines at " www.disposemymeds.org.          This list is accurate as of: 9/12/17 11:58 AM.  Always use your most recent med list.                   Brand Name Dispense Instructions for use Diagnosis    amoxicillin 875 MG tablet    AMOXIL    20 tablet    Take 1 tablet (875 mg) by mouth 2 times daily for 10 days    Acute sinusitis with symptoms > 10 days       cholecalciferol 5000 UNITS Caps capsule    vitamin D3    90 capsule    Take 1 capsule (5,000 Units) by mouth daily Take one capsule daily.    Vitamin D deficiency       doxylamine 25 MG Tabs tablet    UNISOM    30 each    Take 1 tablet (25 mg) by mouth At Bedtime    Nausea       fluticasone 50 MCG/ACT spray    FLONASE    1 Bottle    Spray 1 spray into both nostrils daily    Seasonal allergic rhinitis       loratadine 10 MG tablet    CLARITIN    30 tablet    Take 1 tablet (10 mg) by mouth daily    Acute seasonal allergic rhinitis due to pollen       prenatal multivitamin plus iron 27-0.8 MG Tabs per tablet      Take 1 tablet by mouth daily        pyridOXINE 25 MG tablet    vitamin B-6    60 tablet    Take 1 tablet (25 mg) by mouth 3 times daily    Nausea       sertraline 100 MG tablet    ZOLOFT    90 tablet    Take 1 tablet (100 mg) by mouth daily    SOLANGE (generalized anxiety disorder)

## 2017-09-12 NOTE — LETTER
2017       RE: Vincent Burris  [address] [place] [id number]     Dear Colleague,    Thank you for referring your patient, Vincent Burris, to the [facility] at [facility]. Please see a copy of my visit note below.    SUBJECTIVE:   31 year old, female, , 10w6d,  who presents to the clinic today for a new ob visit.  Feels well, still dealing with mild nausea. Has started PNV.  Estimated Date of Delivery:  is calculated from Patient's last menstrual period was 2017 (exact date).  She has not had bleeding since her LMP.   She has had mild nausea. Weight loss has not occurred.     OTHER CONCERNS: weight gain, fatigue  Reports herpes outbreak in past week, was given episodic treatment at urgent care; symptoms resolving.   - recurrent boil in R axilla, has had for ~ 1 year  ===========================================  ROS  PSYCHIATRIC:  Denies mood changes  PHQ9: Last PHQ-9 score on record= No Value exists for the : HP#PHQ9  Social History   Substance Use Topics     Smoking status: Former Smoker     Packs/day: 0.10     Years: 8.00     Types: Cigarettes     Quit date: 2011     Smokeless tobacco: Never Used     Alcohol use No      Comment: stopped for pregnancy , Drinks 3-5 drinks 1-2 nights per weekend     History   Drug Use No     History   Smoking Status     Former Smoker     Packs/day: 0.10     Years: 8.00     Types: Cigarettes     Quit date: 2011   Smokeless Tobacco     Never Used     Alcohol use No   Comment: stopped for pregnancy , Drinks 3-5 drinks 1-2 nights per weekend     Family History   Problem Relation Age of Onset     Breast Cancer Maternal Grandmother      DIABETES Maternal Grandfather      KIDNEY DISEASE Paternal Grandmother      Skin Cancer No family hx of      Melanoma No family hx of      Hypertension No family hx of      Coronary Artery Disease No family hx of       CEREBROVASCULAR DISEASE No family hx of      Other Cancer No family hx of      ============================================  MEDICAL HISTORY   Allergies   Allergen Reactions     Zithromax [Azithromycin] Hives     hives     Current Outpatient Prescriptions:      amoxicillin (AMOXIL) 875 MG tablet, Take 1 tablet (875 mg) by mouth 2 times daily for 10 days, Disp: 20 tablet, Rfl: 0     loratadine (CLARITIN) 10 MG tablet, Take 1 tablet (10 mg) by mouth daily, Disp: 30 tablet, Rfl: 1     cholecalciferol (VITAMIN D3) 5000 UNITS CAPS capsule, Take 1 capsule (5,000 Units) by mouth daily Take one capsule daily., Disp: 90 capsule, Rfl: 3     Prenatal Vit-Fe Fumarate-FA (PRENATAL MULTIVITAMIN PLUS IRON) 27-0.8 MG TABS per tablet, Take 1 tablet by mouth daily, Disp: , Rfl:      pyridOXINE (VITAMIN B-6) 25 MG tablet, Take 1 tablet (25 mg) by mouth 3 times daily, Disp: 60 tablet, Rfl: 1     doxylamine (UNISOM) 25 MG TABS tablet, Take 1 tablet (25 mg) by mouth At Bedtime (Patient not taking: Reported on 9/5/2017), Disp: 30 each, Rfl: 1     sertraline (ZOLOFT) 100 MG tablet, Take 1 tablet (100 mg) by mouth daily, Disp: 90 tablet, Rfl: 3     fluticasone (FLONASE) 50 MCG/ACT nasal spray, Spray 1 spray into both nostrils daily (Patient not taking: Reported on 9/5/2017), Disp: 1 Bottle, Rfl: 11    Past Medical History:   Diagnosis Date     Atypical chest pain 2013    ekg and workup normla in er     BMI 32.0-32.9,adult 7/30/2017     Dental abscess 2013    seen in [place] for denta lpain and had a periapical abscess given antibiotics, pain meds and a dental block     Former smoker      SOLANGE (generalized anxiety disorder) 6/15/2016     Hidradenitis suppurativa      History of herpes genitalis      LGSIL on Pap smear of cervix 2005     Recurrent major depressive disorder, in partial remission (H)     on lexapro in past , then sertraline , later well butrin added      Past Surgical History:   Procedure Laterality Date     APPENDECTOMY  1999      "C/SECTION, LOW TRANSVERSE  10/31/ 2005    \"Elaina \"     COLPOSCOPY, BIOPSY, COMBINED  2005    + pap, bx 12 & 6 o clock,ECC: FAYE I     DILATION AND CURETTAGE SUCTION, TREAT INCOMPLETE   2009     VAG DELIV ONLY,PREV C-SECTN      \" Pedro\"        Obstetric History       T2      L2     SAB0   TAB0   Ectopic0   Multiple0   Live Births2       # Outcome Date GA Lbr Brooks/2nd Weight Sex Delivery Anes PTL Lv   5 Current            4 AB            3 AB            2 Term   / 01:00  M  EPI N YVONNE      Name: Raul   1 Term     F  EPI N YVONNE      Name: Elaina      Complications: Preeclampsia/Hypertension,Failure to Progress in Second Stage        GYN History- no history of Abnormal Pap Smears; last pap 11/23/15 NILM                        Cervical procedures: denies                        History of STI: HSV    I personally reviewed the past social/family/medical and surgical history on the date of service.   I reviewed lab work done at Intake visit with patient.    OBJECTIVE:   PHYSICAL EXAM:  /77 (BP Location: Left arm, Patient Position: Chair, Cuff Size: Adult Regular)  Pulse 84  Ht 1.676 m (5' 5.98\")  Wt 90.6 kg (199 lb 11.2 oz)  LMP 2017 (Exact Date)  BMI 32.25 kg/m2  BMI- Body mass index is 32.25 kg/(m^2)., GENERAL:  Pleasant pregnant female, alert, cooperative and well groomed.  SKIN:  Warm and dry, without lesions or rashes  HEAD: Symmetrical features.  MOUTH:  Buccal mucosa pink, moist without lesions.  Teeth in good repair.    NECK:  Thyroid without enlargement and nodules.  Lymph nodes not palpable.   LUNGS:  Clear to auscultation.  BREAST: No dominant, fixed or suspicious masses are noted.  No skin or nipple changes or axillary nodes.   Nipples everted.            R axillary: unresolved boil, erythemic  HEART:  RRR without murmur.  ABDOMEN: Soft without masses , tenderness or organomegaly.  No CVA tenderness.  Uterus- difficult to assess due to body habitus.  Well healed " scar from  section. Fetal heart tones present.  MUSCULOSKELETAL:  Full range of motion  EXTREMITIES:  No edema. No significant varicosities.   PELVIC EXAM:  GENITALIA: EGBUS  External genitalia, Bartholin's glands, urethra & [place]'s glands:normal. Vulva reveals no erythema or lesions.        VAGINA:  pink, normal rugae and discharge, no lesions, good tone.  CERVIX:  smooth, without discharge or CMT.               UTERUS: Midposition,  nontender  ADNEXA:  Without masses or tenderness.  RECTAL:  Normal appearance.  Digital exam deferred.  WET PREP:Not done  GC/CHLAMYDIA CULTURE OBTAINED:YES    ASSESSMENT:  Intrauterine pregnancy 10w6d size consistent with dates  Genetic Screening: Level 2 Ultrasound only  Encounter Diagnoses   Name Primary?     Supervision of other normal pregnancy, antepartum Yes     History of        PLAN:  - Reviewed use of triage nurse line and contacting the on-call provider after hours for an urgent need such as fever, vagina bleeding, bladder or vaginal infection, rupture of membranes,  or term labor.    - Discussed appropriate weight gain during pregnancy.  Encouraged healthy diet and physical activity. Discussed small, frequent meals, frequent snacking, ginger candies, or sea bands for nausea.   - Reviewed best evidence for: weight gain for her weight and height for pregnancy:  RECOMMENDED WEIGHT GAIN: 15-25 lbs.    - Discussed the harms, benefits, side effects and alternative therapies for current prescribed and OTC medications.  - All pt's questions discussed and answered.  Pt verbalized understanding of and agreement to plan of care.   - Encouraged follow-up for R axillary boil with Dr. Stephen or  Dr. Dodge.  Information for both given.  - Continue scheduled prenatal care and prn if questions or concerns, RTC in 4 weeks    Again, thank you for allowing me to participate in the care of your patient.      Sincerely,    MART Santiago CNM

## 2017-09-12 NOTE — PROGRESS NOTES
SUBJECTIVE:   31 year old, female, , 10w6d,  who presents to the clinic today for a new ob visit.  Feels well, still dealing with mild nausea. Has started PNV.  Estimated Date of Delivery:  is calculated from Patient's last menstrual period was 2017 (exact date).  She has not had bleeding since her LMP.   She has had mild nausea. Weight loss has not occurred.     OTHER CONCERNS: weight gain, fatigue  Reports herpes outbreak in past week, was given episodic treatment at urgent care; symptoms resolving.   - recurrent boil in R axilla, has had for ~ 1 year  ===========================================  ROS  PSYCHIATRIC:  Denies mood changes  PHQ9: Last PHQ-9 score on record= No Value exists for the : HP#PHQ9  Social History   Substance Use Topics     Smoking status: Former Smoker     Packs/day: 0.10     Years: 8.00     Types: Cigarettes     Quit date: 2011     Smokeless tobacco: Never Used     Alcohol use No      Comment: stopped for pregnancy , Drinks 3-5 drinks 1-2 nights per weekend     History   Drug Use No     History   Smoking Status     Former Smoker     Packs/day: 0.10     Years: 8.00     Types: Cigarettes     Quit date: 2011   Smokeless Tobacco     Never Used     Alcohol use No   Comment: stopped for pregnancy , Drinks 3-5 drinks 1-2 nights per weekend     Family History   Problem Relation Age of Onset     Breast Cancer Maternal Grandmother      DIABETES Maternal Grandfather      KIDNEY DISEASE Paternal Grandmother      Skin Cancer No family hx of      Melanoma No family hx of      Hypertension No family hx of      Coronary Artery Disease No family hx of      CEREBROVASCULAR DISEASE No family hx of      Other Cancer No family hx of      ============================================  MEDICAL HISTORY   Allergies   Allergen Reactions     Zithromax [Azithromycin] Hives     hives     Current Outpatient Prescriptions:      amoxicillin (AMOXIL) 875 MG tablet, Take 1  "tablet (875 mg) by mouth 2 times daily for 10 days, Disp: 20 tablet, Rfl: 0     loratadine (CLARITIN) 10 MG tablet, Take 1 tablet (10 mg) by mouth daily, Disp: 30 tablet, Rfl: 1     cholecalciferol (VITAMIN D3) 5000 UNITS CAPS capsule, Take 1 capsule (5,000 Units) by mouth daily Take one capsule daily., Disp: 90 capsule, Rfl: 3     Prenatal Vit-Fe Fumarate-FA (PRENATAL MULTIVITAMIN PLUS IRON) 27-0.8 MG TABS per tablet, Take 1 tablet by mouth daily, Disp: , Rfl:      pyridOXINE (VITAMIN B-6) 25 MG tablet, Take 1 tablet (25 mg) by mouth 3 times daily, Disp: 60 tablet, Rfl: 1     doxylamine (UNISOM) 25 MG TABS tablet, Take 1 tablet (25 mg) by mouth At Bedtime (Patient not taking: Reported on 2017), Disp: 30 each, Rfl: 1     sertraline (ZOLOFT) 100 MG tablet, Take 1 tablet (100 mg) by mouth daily, Disp: 90 tablet, Rfl: 3     fluticasone (FLONASE) 50 MCG/ACT nasal spray, Spray 1 spray into both nostrils daily (Patient not taking: Reported on 2017), Disp: 1 Bottle, Rfl: 11    Past Medical History:   Diagnosis Date     Atypical chest pain     ekg and workup normla in er     BMI 32.0-32.9,adult 2017     Dental abscess 2013    seen in [place] for denta lpain and had a periapical abscess given antibiotics, pain meds and a dental block     Former smoker      SOLANGE (generalized anxiety disorder) 6/15/2016     Hidradenitis suppurativa      History of herpes genitalis      LGSIL on Pap smear of cervix      Recurrent major depressive disorder, in partial remission (H)     on lexapro in past , then sertraline , later well butrin added      Past Surgical History:   Procedure Laterality Date     APPENDECTOMY       C/SECTION, LOW TRANSVERSE  10/31/ 2005    \"Elaina \"     COLPOSCOPY, BIOPSY, COMBINED  2005    + pap, bx 12 & 6 o clock,ECC: FAYE I     DILATION AND CURETTAGE SUCTION, TREAT INCOMPLETE   2009     VAG DELIV ONLY,PREV C-SECTN      \" Pedro\"        Obstetric History       T2    " "  L2     SAB0   TAB0   Ectopic0   Multiple0   Live Births2       # Outcome Date GA Lbr Brooks/2nd Weight Sex Delivery Anes PTL Lv   5 Current            4 AB            3 AB            2 Term   / 01:00  M  EPI N YVONNE      Name: Raul   1 Term     F  EPI N YVONNE      Name: Elaina      Complications: Preeclampsia/Hypertension,Failure to Progress in Second Stage        GYN History- no history of Abnormal Pap Smears; last pap 11/23/15 NILM                        Cervical procedures: denies                        History of STI: HSV    I personally reviewed the past social/family/medical and surgical history on the date of service.   I reviewed lab work done at Intake visit with patient.    OBJECTIVE:   PHYSICAL EXAM:  /77 (BP Location: Left arm, Patient Position: Chair, Cuff Size: Adult Regular)  Pulse 84  Ht 1.676 m (5' 5.98\")  Wt 90.6 kg (199 lb 11.2 oz)  LMP 2017 (Exact Date)  BMI 32.25 kg/m2  BMI- Body mass index is 32.25 kg/(m^2)., GENERAL:  Pleasant pregnant female, alert, cooperative and well groomed.  SKIN:  Warm and dry, without lesions or rashes  HEAD: Symmetrical features.  MOUTH:  Buccal mucosa pink, moist without lesions.  Teeth in good repair.    NECK:  Thyroid without enlargement and nodules.  Lymph nodes not palpable.   LUNGS:  Clear to auscultation.  BREAST: No dominant, fixed or suspicious masses are noted.  No skin or nipple changes or axillary nodes.   Nipples everted.            R axillary: unresolved boil, erythemic  HEART:  RRR without murmur.  ABDOMEN: Soft without masses , tenderness or organomegaly.  No CVA tenderness.  Uterus- difficult to assess due to body habitus.  Well healed scar from  section. Fetal heart tones present.  MUSCULOSKELETAL:  Full range of motion  EXTREMITIES:  No edema. No significant varicosities.   PELVIC EXAM:  GENITALIA: EGBUS  External genitalia, Bartholin's glands, urethra & [place]'s glands:normal. Vulva reveals no erythema or lesions.      "   VAGINA:  pink, normal rugae and discharge, no lesions, good tone.  CERVIX:  smooth, without discharge or CMT.               UTERUS: Midposition,  nontender  ADNEXA:  Without masses or tenderness.  RECTAL:  Normal appearance.  Digital exam deferred.  WET PREP:Not done  GC/CHLAMYDIA CULTURE OBTAINED:YES    ASSESSMENT:  Intrauterine pregnancy 10w6d size consistent with dates  Genetic Screening: Level 2 Ultrasound only  Encounter Diagnoses   Name Primary?     Supervision of other normal pregnancy, antepartum Yes     History of        PLAN:  - Reviewed use of triage nurse line and contacting the on-call provider after hours for an urgent need such as fever, vagina bleeding, bladder or vaginal infection, rupture of membranes,  or term labor.    - Discussed appropriate weight gain during pregnancy.  Encouraged healthy diet and physical activity. Discussed small, frequent meals, frequent snacking, ginger candies, or sea bands for nausea.   - Reviewed best evidence for: weight gain for her weight and height for pregnancy:  RECOMMENDED WEIGHT GAIN: 15-25 lbs.    - Discussed the harms, benefits, side effects and alternative therapies for current prescribed and OTC medications.  - All pt's questions discussed and answered.  Pt verbalized understanding of and agreement to plan of care.   - Encouraged follow-up for R axillary boil with Dr. Stephen or  Dr. Dodge.  Information for both given.  - Continue scheduled prenatal care and prn if questions or concerns, RTC in 4 weeks

## 2017-09-12 NOTE — NURSING NOTE
Chief Complaint   Patient presents with     Prenatal Care     NOB visit. 10 weeks and 6 days       Suzan Koch, [facility] 9/12/2017

## 2017-09-13 LAB
C TRACH DNA SPEC QL NAA+PROBE: NEGATIVE
N GONORRHOEA DNA SPEC QL NAA+PROBE: NEGATIVE
SPECIMEN SOURCE: NORMAL
SPECIMEN SOURCE: NORMAL

## 2017-10-16 ENCOUNTER — OFFICE VISIT (OUTPATIENT)
Dept: OBGYN | Facility: CLINIC | Age: 32
End: 2017-10-16
Attending: ADVANCED PRACTICE MIDWIFE
Payer: COMMERCIAL

## 2017-10-16 VITALS
HEART RATE: 96 BPM | WEIGHT: 203.2 LBS | BODY MASS INDEX: 32.82 KG/M2 | DIASTOLIC BLOOD PRESSURE: 70 MMHG | SYSTOLIC BLOOD PRESSURE: 115 MMHG

## 2017-10-16 DIAGNOSIS — Z87.59 HISTORY OF PRE-ECLAMPSIA: ICD-10-CM

## 2017-10-16 DIAGNOSIS — Z23 NEED FOR PROPHYLACTIC VACCINATION AND INOCULATION AGAINST INFLUENZA: ICD-10-CM

## 2017-10-16 DIAGNOSIS — Z34.80 SUPERVISION OF OTHER NORMAL PREGNANCY, ANTEPARTUM: Primary | ICD-10-CM

## 2017-10-16 PROCEDURE — G0008 ADMIN INFLUENZA VIRUS VAC: HCPCS | Mod: ZF

## 2017-10-16 PROCEDURE — 25000128 H RX IP 250 OP 636: Mod: ZF

## 2017-10-16 PROCEDURE — 90686 IIV4 VACC NO PRSV 0.5 ML IM: CPT | Mod: ZF

## 2017-10-16 PROCEDURE — 99212 OFFICE O/P EST SF 10 MIN: CPT | Mod: ZF

## 2017-10-16 NOTE — PROGRESS NOTES
Subjective:      32 year old  at 15w5d presents for a routine prenatal appointment.         Denies cramping/contractions, vaginal bleeding or leakage of fluid.  Not sure if she feels fetal movement yet.       No HA, visual changes, RUQ or epigastric pain.     Patient concerns: Feeling well. Nausea has resolved.  Present at appointment with partner, INDIGO.  Was treated for hsv outbreak on 17- now resolved- plan for suppression at 36 weeks. Started vitamin d supplement. Agreeable to daily aspirin for pre-e prevention d/t hx of pre-eclampsia.   Has level 2 ultrasound scheduled for 17- plans to find out boy or  Girl.  Desires TOLAC. Has some interest in tubal.   Wants CNM care.    Objective:  Vitals:    10/16/17 0908   BP: 115/70   BP Location: Left arm   Patient Position: Chair   Pulse: 96   Weight: 92.2 kg (203 lb 3.2 oz)       See OB flowsheet    Assessment/Plan     Encounter Diagnoses   Name Primary?     Supervision of other normal pregnancy, antepartum Yes     History of pre-eclampsia      Need for prophylactic vaccination and inoculation against influenza      Orders Placed This Encounter   Procedures     HC FLU VAC PRESRV FREE QUAD SPLIT VIR 3+YRS IM     Orders Placed This Encounter   Medications     aspirin 81 MG tablet     Sig: Take 1 tablet (81 mg) by mouth daily     Dispense:  120 tablet     Refill:  1     - Reviewed total weight gain, encouraged continued healthy diet and exercise.      - Reviewed why/how to contact provider.    Patient education/orders or handouts today:  PTL signs/symptoms, Fetal movement and Level 2 u/s scheduled     Desires CNM care.  Reviewed vitamin d deficiency and recommendation for 5000 IU/day supplementation.  Discussed daily aspirin for pre-eclampsia prevention. Pt agreeable.  Prescription for 81mg/day aspirin placed.   Desires TOLAC, will need to sign consent at upcoming visit. Some interest in tubal ligation- plan to continue discussion.  Level 2 ultrasound  scheduled for 11/6/17.  Declines Quad screen.  Flu shot given today.  Continue scheduled prenatal care, RTC in approx 4-6 weeks and prn if questions or concerns.     MART Head, SHASHAM

## 2017-10-16 NOTE — NURSING NOTE
Chief Complaint   Patient presents with     Prenatal Care     15w5d       See QUINTON Prabhakar 10/16/2017                Vincent Burris      1.  Has the patient received the information for the influenza vaccine? YES    2.  Does the patient have any of the following contraindications?     Allergy to eggs? No     Allergic reaction to previous influenza vaccines? No     Any other problems to previous influenza vaccines? No     Paralyzed by Guillain-[place] syndrome? No     Currently pregnant? Yes, 15w5d weeks gestation.     Current moderate or severe illness? No     Allergy to contact lens solution? No    3.  The vaccine has been administered in the usual fashion and the patient was instructed to wait 20 minutes before leaving the building in the event of an allergic reaction: YES    Vaccination given by See QUINTON Prabhakar .  Recorded by See Vanna

## 2017-10-16 NOTE — MR AVS SNAPSHOT
After Visit Summary   10/16/2017    Vincent Burris    MRN: [id number]           Patient Information     Date Of Birth          1985        Visit Information        Provider Department      10/16/2017 9:00 AM Gissel Kirby CNM [facility]        Today's Diagnoses     Supervision of other normal pregnancy, antepartum    -  1    History of pre-eclampsia        Need for prophylactic vaccination and inoculation against influenza           Follow-ups after your visit        Follow-up notes from your care team     Return in about 4 weeks (around 11/13/2017) for ENRRIQUE BRENNAN.      Your next 10 appointments already scheduled     Nov 06, 2017  8:00 AM CST   LILIA US COMP with [id number]   [facility])    [address]  [facility] [address] [phone]           Wear comfortable clothes and leave your valuables at home.            Nov 06, 2017  8:30 AM CST   Radiology MD with UR LILIA PALMA   [facility])    [address]  [facility] [address] [phone]           Please arrive at the time given for your first appointment. This visit is used internally to schedule the physician's time during your ultrasound.            Nov 20, 2017  8:15 AM CST   RETURN OB with MART Ge CNM   [facility] ([facility])    [facility]  [address]  [address]  [facility] [address] [phone]              Who to contact     Please call your clinic at [phone] to:    Ask questions about your health    Make or cancel appointments    Discuss your medicines    Learn about your test results    Speak to your doctor   If you have compliments or concerns about an experience at your clinic, or if you wish to file a complaint,  please contact [facility] Patient Relations at [phone] or email us at [email or web address]         Additional Information About Your Visit        [facility] Information     [employer] gives you secure access to your electronic health record. If you see a primary care provider, you can also send messages to your care team and make appointments. If you have questions, please call your primary care clinic.  If you do not have a primary care provider, please call [phone] and they will assist you.      [employer] is an electronic gateway that provides easy, online access to your medical records. With [employer], you can request a clinic appointment, read your test results, renew a prescription or communicate with your care team.     To access your existing account, please contact your [facility] or call [phone] for assistance.        Care EveryWhere ID     This is your Care EveryWhere ID. This could be used by other organizations to access your [facility] medical records  [id number]        Your Vitals Were     Pulse Last Period Breastfeeding? BMI (Body Mass Index)          96 06/28/2017 (Exact Date) No 32.82 kg/m2         Blood Pressure from Last 3 Encounters:   10/16/17 115/70   09/12/17 117/77   09/08/17 111/67    Weight from Last 3 Encounters:   10/16/17 92.2 kg (203 lb 3.2 oz)   09/12/17 90.6 kg (199 lb 11.2 oz)   09/08/17 92.1 kg (203 lb)              We Performed the Following     HC FLU VAC PRESRV FREE QUAD SPLIT VIR 3+YRS IM          Today's Medication Changes          These changes are accurate as of: 10/16/17 11:59 PM.  If you have any questions, ask your nurse or doctor.               Start taking these medicines.        Dose/Directions    aspirin 81 MG tablet   Used for:  History of pre-eclampsia   Started by:  Gissel Kirby CNM        Dose:  81 mg   Take 1 tablet (81 mg) by mouth daily   Quantity:  120 tablet    Refills:  1            Where to get your medicines      These medications were sent to [facility] [place], [place] - [address]  [address], [place] [address]     Phone:  [phone]     aspirin 81 MG tablet                Primary Care Provider Office Phone # Fax #    Xiomara Mariela Smith [phone]       [address] [place] [address]  [facility] [address]        Equal Access to Services     JASVIR HOLLOWAY : Hadii aad ku hadasho Soomaali, waaxda luqadaha, qaybta kaalmada adeegyada, waxay idiin hayaan adeeg kharakellie laflex . So [facility] [phone].    ATENCIÓN: Si habla español, tiene a alston disposición servicios gratuitos de asistencia lingüística. [facility] [phone].    We comply with applicable federal civil rights laws and [place] laws. We do not discriminate on the basis of race, color, national origin, age, disability, sex, sexual orientation, or gender identity.            Thank you!     Thank you for choosing [facility]  for your care. Our goal is always to provide you with excellent care. Hearing back from our patients is one way we can continue to improve our services. Please take a few minutes to complete the written survey that you may receive in the mail after your visit with us. Thank you!             Your Updated Medication List - Protect others around you: Learn how to safely use, store and throw away your medicines at www.disposemymeds.org.          This list is accurate as of: 10/16/17 11:59 PM.  Always use your most recent med list.                   Brand Name Dispense Instructions for use Diagnosis    aspirin 81 MG tablet     120 tablet    Take 1 tablet (81 mg) by mouth daily    History of pre-eclampsia       cholecalciferol 5000 UNITS Caps capsule    vitamin D3    90 capsule    Take 1 capsule (5,000 Units) by mouth daily Take one capsule daily.    Vitamin D deficiency       loratadine 10 MG tablet    CLARITIN    30  tablet    Take 1 tablet (10 mg) by mouth daily    Acute seasonal allergic rhinitis due to pollen       prenatal multivitamin plus iron 27-0.8 MG Tabs per tablet      Take 1 tablet by mouth daily        pyridOXINE 25 MG tablet    vitamin B-6    60 tablet    Take 1 tablet (25 mg) by mouth 3 times daily    Nausea       sertraline 100 MG tablet    ZOLOFT    90 tablet    Take 1 tablet (100 mg) by mouth daily    SOLANGE (generalized anxiety disorder)

## 2017-10-16 NOTE — LETTER
10/16/2017       RE: Vincent Burris  [address] [place] [id number]     Dear Colleague,    Thank you for referring your patient, Vincent Burris, to the [facility] at [facility]. Please see a copy of my visit note below.    Subjective:      32 year old  at 15w5d presents for a routine prenatal appointment.         Denies cramping/contractions, vaginal bleeding or leakage of fluid.  Not sure if she feels fetal movement yet.       No HA, visual changes, RUQ or epigastric pain.     Patient concerns: Feeling well. Nausea has resolved.  Present at appointment with partner, JJ.  Was treated for hsv outbreak on 17- now resolved- plan for suppression at 36 weeks. Started vitamin d supplement. Agreeable to daily aspirin for pre-e prevention d/t hx of pre-eclampsia.   Has level 2 ultrasound scheduled for 17- plans to find out boy or  Girl.  Desires TOLAC. Has some interest in tubal.   Wants CNM care.    Objective:  Vitals:    10/16/17 0908   BP: 115/70   BP Location: Left arm   Patient Position: Chair   Pulse: 96   Weight: 92.2 kg (203 lb 3.2 oz)       See OB flowsheet    Assessment/Plan     Encounter Diagnoses   Name Primary?     Supervision of other normal pregnancy, antepartum Yes     History of pre-eclampsia      Need for prophylactic vaccination and inoculation against influenza      Orders Placed This Encounter   Procedures     HC FLU VAC PRESRV FREE QUAD SPLIT VIR 3+YRS IM     Orders Placed This Encounter   Medications     aspirin 81 MG tablet     Sig: Take 1 tablet (81 mg) by mouth daily     Dispense:  120 tablet     Refill:  1     - Reviewed total weight gain, encouraged continued healthy diet and exercise.      - Reviewed why/how to contact provider.    Patient education/orders or handouts today:  PTL signs/symptoms, Fetal movement and Level 2 u/s scheduled     Desires CNM care.  Reviewed vitamin d deficiency and  recommendation for 5000 IU/day supplementation.  Discussed daily aspirin for pre-eclampsia prevention. Pt agreeable.  Prescription for 81mg/day aspirin placed.   Desires TOLAC, will need to sign consent at upcoming visit. Some interest in tubal ligation- plan to continue discussion.  Level 2 ultrasound scheduled for 11/6/17.  Declines Quad screen.  Flu shot given today.  Continue scheduled prenatal care, RTC in approx 4-6 weeks and prn if questions or concerns.     Again, thank you for allowing me to participate in the care of your patient.      Sincerely,    Gissel Kirby CNM

## 2017-11-01 ENCOUNTER — PRE VISIT (OUTPATIENT)
Dept: MATERNAL FETAL MEDICINE | Facility: CLINIC | Age: 32
End: 2017-11-01

## 2017-11-06 ENCOUNTER — HOSPITAL ENCOUNTER (OUTPATIENT)
Dept: ULTRASOUND IMAGING | Facility: CLINIC | Age: 32
Discharge: HOME | End: 2017-11-06
Attending: ADVANCED PRACTICE MIDWIFE | Admitting: OBSTETRICS & GYNECOLOGY
Payer: COMMERCIAL

## 2017-11-06 ENCOUNTER — OFFICE VISIT (OUTPATIENT)
Dept: MATERNAL FETAL MEDICINE | Facility: CLINIC | Age: 32
End: 2017-11-06
Attending: ADVANCED PRACTICE MIDWIFE
Payer: COMMERCIAL

## 2017-11-06 DIAGNOSIS — O43.199 MARGINAL INSERTION OF UMBILICAL CORD AFFECTING MANAGEMENT OF MOTHER: Primary | ICD-10-CM

## 2017-11-06 DIAGNOSIS — O26.90 PREGNANCY RELATED CONDITION, UNSPECIFIED TRIMESTER: ICD-10-CM

## 2017-11-06 DIAGNOSIS — Z36.3 SCREENING, ANTENATAL, FOR MALFORMATION BY ULTRASOUND: ICD-10-CM

## 2017-11-06 PROCEDURE — 76811 OB US DETAILED SNGL FETUS: CPT

## 2017-11-20 ENCOUNTER — OFFICE VISIT (OUTPATIENT)
Dept: OBGYN | Facility: CLINIC | Age: 32
End: 2017-11-20
Attending: MIDWIFE
Payer: COMMERCIAL

## 2017-11-20 VITALS
SYSTOLIC BLOOD PRESSURE: 126 MMHG | DIASTOLIC BLOOD PRESSURE: 76 MMHG | WEIGHT: 206.8 LBS | HEIGHT: 66 IN | BODY MASS INDEX: 33.23 KG/M2

## 2017-11-20 DIAGNOSIS — Z34.80 SUPERVISION OF OTHER NORMAL PREGNANCY, ANTEPARTUM: Primary | ICD-10-CM

## 2017-11-20 PROCEDURE — 99211 OFF/OP EST MAY X REQ PHY/QHP: CPT | Mod: ZF

## 2017-11-20 NOTE — MR AVS SNAPSHOT
After Visit Summary   11/20/2017    Vincent Burris    MRN: [id number]           Patient Information     Date Of Birth          1985        Visit Information        Provider Department      11/20/2017 8:15 AM Jennifer Clifford APRN CNM [facility]        Today's Diagnoses     Supervision of other normal pregnancy, antepartum    -  1       Follow-ups after your visit        Follow-up notes from your care team     Return in about 4 weeks (around 12/18/2017) for ISAIAS.      Your next 10 appointments already scheduled     Jan 08, 2018  8:00 AM RAMON RODRIGUES US COMPRE SINGLE F/U with [id number]   [facility])    [address]  [facility] [address] [phone]           Wear comfortable clothes and leave your valuables at home.            Jan 08, 2018  8:30 AM RAMON   Radiology MD with UR LILIA PALMA   [facility])    [address]  [facility] [id number] [phone]           Please arrive at the time given for your first appointment. This visit is used internally to schedule the physician's time during your ultrasound.              Who to contact     Please call your clinic at [phone] to:    Ask questions about your health    Make or cancel appointments    Discuss your medicines    Learn about your test results    Speak to your doctor   If you have compliments or concerns about an experience at your clinic, or if you wish to file a complaint, please contact [facility] Patient Relations at [phone] or email us at [email or web address]         Additional Information About Your Visit        MyChart Information     [employer] gives you secure access to your electronic health record. If you see a primary care provider, you can also send messages to your care team  "and make appointments. If you have questions, please call your primary care clinic.  If you do not have a primary care provider, please call [phone] and they will assist you.      [employer] is an electronic gateway that provides easy, online access to your medical records. With [employer], you can request a clinic appointment, read your test results, renew a prescription or communicate with your care team.     To access your existing account, please contact your [facility] or call [phone] for assistance.        Care EveryWhere ID     This is your Care EveryWhere ID. This could be used by other organizations to access your [place] medical records  [id number]        Your Vitals Were     Height Last Period BMI (Body Mass Index)             1.676 m (5' 5.98\") 06/28/2017 (Exact Date) 33.39 kg/m2          Blood Pressure from Last 3 Encounters:   11/20/17 126/76   10/16/17 115/70   09/12/17 117/77    Weight from Last 3 Encounters:   11/20/17 93.8 kg (206 lb 12.8 oz)   10/16/17 92.2 kg (203 lb 3.2 oz)   09/12/17 90.6 kg (199 lb 11.2 oz)              Today, you had the following     No orders found for display       Primary Care Provider Office Phone # Fax #    Xiomara Mariela Smith [phone]       [address] [place] [address]  [facility] [address]        Equal Access to Services     [facility] : Hadii holly Mcelroy, waaxda lutodd, qaybta kaalmada raysa, marichuy adkins. So [facility] [phone].    ATENCIÓN: Si habla español, tiene a alston disposición servicios gratuitos de asistencia lingüística. Llame al [phone].    We comply with applicable federal civil rights laws and [place] laws. We do not discriminate on the basis of race, color, national origin, age, disability, sex, sexual orientation, or gender identity.            Thank you!     Thank you for choosing [facility]  for your care. Our goal " is always to provide you with excellent care. Hearing back from our patients is one way we can continue to improve our services. Please take a few minutes to complete the written survey that you may receive in the mail after your visit with us. Thank you!             Your Updated Medication List - Protect others around you: Learn how to safely use, store and throw away your medicines at www.disposemymeds.org.          This list is accurate as of: 11/20/17  8:31 AM.  Always use your most recent med list.                   Brand Name Dispense Instructions for use Diagnosis    aspirin 81 MG tablet     120 tablet    Take 1 tablet (81 mg) by mouth daily    History of pre-eclampsia       cholecalciferol 5000 UNITS Caps capsule    vitamin D3    90 capsule    Take 1 capsule (5,000 Units) by mouth daily Take one capsule daily.    Vitamin D deficiency       loratadine 10 MG tablet    CLARITIN    30 tablet    Take 1 tablet (10 mg) by mouth daily    Acute seasonal allergic rhinitis due to pollen       prenatal multivitamin plus iron 27-0.8 MG Tabs per tablet      Take 1 tablet by mouth daily        pyridOXINE 25 MG tablet    vitamin B-6    60 tablet    Take 1 tablet (25 mg) by mouth 3 times daily    Nausea       sertraline 100 MG tablet    ZOLOFT    90 tablet    Take 1 tablet (100 mg) by mouth daily    SOLANGE (generalized anxiety disorder)

## 2017-11-20 NOTE — LETTER
2017       RE: Vincent Burris  [address] [place] [id number]     Dear Colleague,    Thank you for referring your patient, Vincent Burris, to the [facility] at [facility]. Please see a copy of my visit note below.    Subjective:      32 year old  at 20w5d presents for a routine prenatal appointment.       no vaginal bleeding or leakage of fluid.  occ  contractions or cramping.    good  fetal movement.       No HA, visual changes, RUQ or epigastric pain.   The patient presents with the following concerns:   Level II US  Results reviewed normal scan.  Repeat growth for marginal cord insertion     Objective:  There were no vitals filed for this visit.  See OB flowsheet    Assessment/Plan     Encounter Diagnosis   Name Primary?     Supervision of other normal pregnancy, antepartum Yes   disc limiting weight gain.   - Reviewed total weight gain, encouraged continued healthy diet and exercise.      - Reviewed why/how to contact provider.    Patient education/orders or handouts today:  PTL signs/symptoms   Return to clinic in 4 weeks and prn if questions or concerns.   MART Ge CNM

## 2017-12-06 ENCOUNTER — TELEPHONE (OUTPATIENT)
Dept: OBGYN | Facility: CLINIC | Age: 32
End: 2017-12-06

## 2017-12-06 NOTE — TELEPHONE ENCOUNTER
Spoke with Vincent who is 23 weeks pregnant. She state that last night she experienced dizziness like the room was spinning. She said it only lasted a few seconds. Today she reports nausea but no vomiting. Denies headache, swelling, visual changes, or RUQ pain. Nurse advised that she hydrate/rest and call back if symptoms persist. Patient agreeable to plan and will try these measures.

## 2017-12-13 ENCOUNTER — TELEPHONE (OUTPATIENT)
Dept: URGENT CARE | Facility: URGENT CARE | Age: 32
End: 2017-12-13

## 2017-12-13 DIAGNOSIS — Z20.828 EXPOSURE TO INFLUENZA: Primary | ICD-10-CM

## 2017-12-13 RX ORDER — OSELTAMIVIR PHOSPHATE 75 MG/1
75 CAPSULE ORAL DAILY
Qty: 10 CAPSULE | Refills: 0 | Status: SHIPPED | OUTPATIENT
Start: 2017-12-13

## 2017-12-18 ENCOUNTER — OFFICE VISIT (OUTPATIENT)
Dept: OBGYN | Facility: CLINIC | Age: 32
End: 2017-12-18
Attending: MIDWIFE
Payer: COMMERCIAL

## 2017-12-18 VITALS
DIASTOLIC BLOOD PRESSURE: 79 MMHG | HEIGHT: 66 IN | BODY MASS INDEX: 34.01 KG/M2 | WEIGHT: 211.6 LBS | SYSTOLIC BLOOD PRESSURE: 129 MMHG

## 2017-12-18 DIAGNOSIS — Z34.80 SUPERVISION OF OTHER NORMAL PREGNANCY, ANTEPARTUM: Primary | ICD-10-CM

## 2017-12-18 DIAGNOSIS — D50.8 OTHER IRON DEFICIENCY ANEMIA: ICD-10-CM

## 2017-12-18 LAB
BASOPHILS # BLD AUTO: 0 10E9/L (ref 0–0.2)
BASOPHILS NFR BLD AUTO: 0.4 %
DIFFERENTIAL METHOD BLD: ABNORMAL
EOSINOPHIL # BLD AUTO: 0.2 10E9/L (ref 0–0.7)
EOSINOPHIL NFR BLD AUTO: 3.2 %
ERYTHROCYTE [DISTWIDTH] IN BLOOD BY AUTOMATED COUNT: 13.1 % (ref 10–15)
FERRITIN SERPL-MCNC: 11 NG/ML (ref 12–150)
HCT VFR BLD AUTO: 31.1 % (ref 35–47)
HGB BLD-MCNC: 10.2 G/DL (ref 11.7–15.7)
IMM GRANULOCYTES # BLD: 0 10E9/L (ref 0–0.4)
IMM GRANULOCYTES NFR BLD: 0.4 %
IRON SATN MFR SERPL: 11 % (ref 15–46)
IRON SERPL-MCNC: 54 UG/DL (ref 35–180)
LYMPHOCYTES # BLD AUTO: 1.4 10E9/L (ref 0.8–5.3)
LYMPHOCYTES NFR BLD AUTO: 25.4 %
MCH RBC QN AUTO: 27.6 PG (ref 26.5–33)
MCHC RBC AUTO-ENTMCNC: 32.8 G/DL (ref 31.5–36.5)
MCV RBC AUTO: 84 FL (ref 78–100)
MONOCYTES # BLD AUTO: 0.5 10E9/L (ref 0–1.3)
MONOCYTES NFR BLD AUTO: 8.3 %
NEUTROPHILS # BLD AUTO: 3.5 10E9/L (ref 1.6–8.3)
NEUTROPHILS NFR BLD AUTO: 62.3 %
NRBC # BLD AUTO: 0 10*3/UL
NRBC BLD AUTO-RTO: 0 /100
PLATELET # BLD AUTO: 232 10E9/L (ref 150–450)
RBC # BLD AUTO: 3.69 10E12/L (ref 3.8–5.2)
TIBC SERPL-MCNC: 492 UG/DL (ref 240–430)
WBC # BLD AUTO: 5.7 10E9/L (ref 4–11)

## 2017-12-18 PROCEDURE — 36415 COLL VENOUS BLD VENIPUNCTURE: CPT | Performed by: MIDWIFE

## 2017-12-18 PROCEDURE — 83550 IRON BINDING TEST: CPT | Performed by: MIDWIFE

## 2017-12-18 PROCEDURE — 99211 OFF/OP EST MAY X REQ PHY/QHP: CPT | Mod: ZF

## 2017-12-18 PROCEDURE — 85018 HEMOGLOBIN: CPT | Performed by: MIDWIFE

## 2017-12-18 PROCEDURE — 83540 ASSAY OF IRON: CPT | Performed by: MIDWIFE

## 2017-12-18 PROCEDURE — 82728 ASSAY OF FERRITIN: CPT | Performed by: MIDWIFE

## 2017-12-18 PROCEDURE — 85025 COMPLETE CBC W/AUTO DIFF WBC: CPT | Performed by: MIDWIFE

## 2017-12-18 RX ORDER — ACETAMINOPHEN 325 MG/1
650 TABLET ORAL
Status: CANCELLED
Start: 2018-01-02

## 2017-12-18 RX ORDER — ASCORBIC ACID 250 MG
250 TABLET,CHEWABLE ORAL DAILY
Qty: 90 TABLET | Refills: 0 | Status: SHIPPED | OUTPATIENT
Start: 2017-12-18

## 2017-12-18 RX ORDER — FERROUS SULFATE 325(65) MG
325 TABLET ORAL
Qty: 60 TABLET | Refills: 2 | Status: SHIPPED | OUTPATIENT
Start: 2017-12-18

## 2017-12-18 RX ORDER — DIPHENHYDRAMINE HCL 25 MG
25 TABLET ORAL
Status: CANCELLED
Start: 2018-01-02

## 2017-12-18 RX ORDER — ASPIRIN 81 MG
100 TABLET, DELAYED RELEASE (ENTERIC COATED) ORAL DAILY
Qty: 60 TABLET | Refills: 1 | Status: SHIPPED | OUTPATIENT
Start: 2017-12-18

## 2017-12-18 ASSESSMENT — PAIN SCALES - GENERAL: PAINLEVEL: NO PAIN (0)

## 2017-12-18 NOTE — MR AVS SNAPSHOT
After Visit Summary   12/18/2017    Vincent Burris    MRN: [id number]           Patient Information     Date Of Birth          1985        Visit Information        Provider Department      12/18/2017 8:15 AM Jennifer Clifford APRN CNM [facility]        Today's Diagnoses     Supervision of other normal pregnancy, antepartum    -  1       Follow-ups after your visit        Follow-up notes from your care team     Return in about 4 weeks (around 1/15/2018) for EOB.      Your next 10 appointments already scheduled     Jan 08, 2018  8:00 AM RAMON RODRIGUES US COMPRE SINGLE F/U with [id number]   [facility])    [address]  [facility] [address] [phone]           Wear comfortable clothes and leave your valuables at home.            Jan 08, 2018  8:30 AM RAMON   Radiology MD with UR LILIA PALMA   [facility])    [address]  [facility] [address] [phone]           Please arrive at the time given for your first appointment. This visit is used internally to schedule the physician's time during your ultrasound.              Who to contact     Please call your clinic at [phone] to:    Ask questions about your health    Make or cancel appointments    Discuss your medicines    Learn about your test results    Speak to your doctor   If you have compliments or concerns about an experience at your clinic, or if you wish to file a complaint, please contact [facility] Patient Relations at [phone] or email us at [email or web address]         Additional Information About Your Visit        MyChart Information     [employer] gives you secure access to your electronic health record. If you see a primary care provider, you can also send messages to your care team  "and make appointments. If you have questions, please call your primary care clinic.  If you do not have a primary care provider, please call [phone] and they will assist you.      [employer] is an electronic gateway that provides easy, online access to your medical records. With [employer], you can request a clinic appointment, read your test results, renew a prescription or communicate with your care team.     To access your existing account, please contact your [facility] or call [phone] for assistance.        Care EveryWhere ID     This is your Care EveryWhere ID. This could be used by other organizations to access your [facility] medical records  [id number]        Your Vitals Were     Height Last Period BMI (Body Mass Index)             1.676 m (5' 5.98\") 06/28/2017 (Exact Date) 34.17 kg/m2          Blood Pressure from Last 3 Encounters:   12/18/17 129/79   11/20/17 126/76   10/16/17 115/70    Weight from Last 3 Encounters:   12/18/17 96 kg (211 lb 9.6 oz)   11/20/17 93.8 kg (206 lb 12.8 oz)   10/16/17 92.2 kg (203 lb 3.2 oz)              We Performed the Following     Hemoglobin        Primary Care Provider Office Phone # Fax #    Xiomara Mariela Smith [phone]       [address] [place] [address]  [facility] [address]        Equal Access to Services     [facility] : Hadii holly ku hadasho Soned, waaxda luqadaha, qaybta kaalmada adeegyada, marichuy bearden . So [facility] [phone].    ATENCIÓN: Si habla español, tiene a alston disposición servicios gratuitos de asistencia lingüística. Llame al [phone].    We comply with applicable federal civil rights laws and [place] laws. We do not discriminate on the basis of race, color, national origin, age, disability, sex, sexual orientation, or gender identity.            Thank you!     Thank you for choosing [facility]  for your care. Our goal is always to provide " you with excellent care. Hearing back from our patients is one way we can continue to improve our services. Please take a few minutes to complete the written survey that you may receive in the mail after your visit with us. Thank you!             Your Updated Medication List - Protect others around you: Learn how to safely use, store and throw away your medicines at www.disposemymeds.org.          This list is accurate as of: 12/18/17  8:38 AM.  Always use your most recent med list.                   Brand Name Dispense Instructions for use Diagnosis    aspirin 81 MG tablet     120 tablet    Take 1 tablet (81 mg) by mouth daily    History of pre-eclampsia       cholecalciferol 5000 UNITS Caps capsule    vitamin D3    90 capsule    Take 1 capsule (5,000 Units) by mouth daily Take one capsule daily.    Vitamin D deficiency       loratadine 10 MG tablet    CLARITIN    30 tablet    Take 1 tablet (10 mg) by mouth daily    Acute seasonal allergic rhinitis due to pollen       oseltamivir 75 MG capsule    TAMIFLU    10 capsule    Take 1 capsule (75 mg) by mouth daily    Exposure to influenza       prenatal multivitamin plus iron 27-0.8 MG Tabs per tablet      Take 1 tablet by mouth daily        pyridOXINE 25 MG tablet    vitamin B-6    60 tablet    Take 1 tablet (25 mg) by mouth 3 times daily    Nausea       sertraline 100 MG tablet    ZOLOFT    90 tablet    Take 1 tablet (100 mg) by mouth daily    SOLANGE (generalized anxiety disorder)

## 2017-12-18 NOTE — PROGRESS NOTES
"Subjective:      32 year old  at 24w5d presents for a routine prenatal appointment.       no vaginal bleeding or leakage of fluid.  no contractions or cramping.    good  fetal movement.    Leaving for [place]   No HA, visual changes, RUQ or epigastric pain.   The patient presents with the following concerns:    Level II US  Results reviewed normal scan.  Marginal insertion of cord  Having some intermittent dizzyness  hgb check 9.3 finger stick      Objective:  Vitals:    17 0826   BP: 129/79   Weight: 96 kg (211 lb 9.6 oz)   Height: 1.676 m (5' 5.98\")     See OB flowsheet    Assessment/Plan   supervision of other high risk pregnancy   - Reviewed total weight gain, encouraged continued healthy diet and exercise.      - Reviewed why/how to contact provider.    Patient education/orders or handouts today:  PTL signs/symptoms and Plan for EOB visit w labs   Return to clinic in 4 weeks and prn if questions or concerns.   Iron deficiency panelbdrawn  Will start po iron prior to trip  discu iron infusions after trip depending on lab reults   MART Ge CNM                "

## 2017-12-18 NOTE — LETTER
"2017       RE: Vincent Burris  [address] [place], [place] [id number]     Dear Colleague,    Thank you for referring your patient, Vincent Burris, to the [facility] at [facility]. Please see a copy of my visit note below.    Subjective:      32 year old  at 24w5d presents for a routine prenatal appointment.       no vaginal bleeding or leakage of fluid.  no contractions or cramping.    good  fetal movement.    Leaving for [place]   No HA, visual changes, RUQ or epigastric pain.   The patient presents with the following concerns:    Level II US  Results reviewed normal scan.  Marginal insertion of cord  Having some intermittent dizzyness  hgb check 9.3 finger stick      Objective:  Vitals:    17 0826   BP: 129/79   Weight: 96 kg (211 lb 9.6 oz)   Height: 1.676 m (5' 5.98\")     See OB flowsheet    Assessment/Plan   supervision of other high risk pregnancy   - Reviewed total weight gain, encouraged continued healthy diet and exercise.      - Reviewed why/how to contact provider.    Patient education/orders or handouts today:  PTL signs/symptoms and Plan for EOB visit w labs   Return to clinic in 4 weeks and prn if questions or concerns.   Iron deficiency panelbdrawn  Will start po iron prior to trip  discu iron infusions after trip depending on lab reults         MART Ge CNM    "

## 2017-12-18 NOTE — NURSING NOTE
Chief Complaint   Patient presents with     Prenatal Care   24.6 weeks ob visit kids had flu last week-  Cough today

## 2017-12-20 ENCOUNTER — TELEPHONE (OUTPATIENT)
Dept: OBGYN | Facility: CLINIC | Age: 32
End: 2017-12-20

## 2017-12-20 NOTE — TELEPHONE ENCOUNTER
Spoke with Vincent who states she has cold symptoms and is wondering what she can take. We discussed treating by symptom and what medications are safe to take in pregnancy, and advised increasing fluids and rest. She also has a low back pain that has been ongoing over the last 2 weeks. It hurts all the time even when sitting and standing. She has not tried anything. No change in fetal movement, no contractions, vaginal bleeding, or LOF. No increasing vaginal pressure or pain.     Discussed that she could try taking tylenol, increasing rest, and applying a warm pack but that if pain does not resolve she should be seen for earlier appt. She understood plan and had no further questions.

## 2017-12-20 NOTE — PROGRESS NOTES
Spoke with patient and informed of iron levels and recommendation for infusion and to take oral supplements until infusion is scheduled. Informed that oral iron and vitamins have been sent to pharmacy. Gave pt infusion center phone number for scheduling.     Patient agreeable with plan.

## 2018-01-12 ENCOUNTER — INFUSION THERAPY VISIT (OUTPATIENT)
Dept: INFUSION THERAPY | Facility: CLINIC | Age: 33
End: 2018-01-12
Attending: INTERNAL MEDICINE
Payer: MEDICAID

## 2018-01-12 VITALS
RESPIRATION RATE: 16 BRPM | SYSTOLIC BLOOD PRESSURE: 121 MMHG | HEART RATE: 98 BPM | DIASTOLIC BLOOD PRESSURE: 68 MMHG | TEMPERATURE: 98.1 F

## 2018-01-12 DIAGNOSIS — D50.8 OTHER IRON DEFICIENCY ANEMIA: Primary | ICD-10-CM

## 2018-01-12 DIAGNOSIS — Z34.80 SUPERVISION OF OTHER NORMAL PREGNANCY, ANTEPARTUM: ICD-10-CM

## 2018-01-12 PROCEDURE — 25000128 H RX IP 250 OP 636: Performed by: MIDWIFE

## 2018-01-12 PROCEDURE — 96365 THER/PROPH/DIAG IV INF INIT: CPT

## 2018-01-12 RX ORDER — DIPHENHYDRAMINE HCL 25 MG
25 TABLET ORAL
Status: CANCELLED
Start: 2018-01-12

## 2018-01-12 RX ORDER — ACETAMINOPHEN 325 MG/1
650 TABLET ORAL
Status: CANCELLED
Start: 2018-01-12

## 2018-01-12 RX ADMIN — IRON SUCROSE 300 MG: 20 INJECTION, SOLUTION INTRAVENOUS at 14:36

## 2018-01-12 ASSESSMENT — PAIN SCALES - GENERAL: PAINLEVEL: NO PAIN (0)

## 2018-01-12 NOTE — PROGRESS NOTES
Infusion Nursing Note:  Vincent Burris presents today for Venofer infusion   Patient seen by provider today: No   present during visit today: Not Applicable.    Note: first time to infusion center, first time dose of Venofer IV. Medication teaching done with pt. .    Intravenous Access:  Peripheral IV placed.    Treatment Conditions:  Not Applicable.    Post Infusion Assessment:  Patient tolerated infusion without incident.  Site patent and intact, free from redness, edema or discomfort.  No evidence of extravasations.  Access discontinued per protocol.    Discharge Plan:   Discharge instructions reviewed with: Patient.  Patient and/or family verbalized understanding of discharge instructions and all questions answered.  Copy of AVS reviewed with patient and/or family.  Patient will return 1/18/17 for next appointment.  Patient discharged in stable condition accompanied by: self.  Departure Mode: Ambulatory.    Talia Lara RN

## 2018-01-12 NOTE — MR AVS SNAPSHOT
After Visit Summary   1/12/2018    Vincent Burris    MRN: [id number]           Patient Information     Date Of Birth          1985        Visit Information        Provider Department      1/12/2018 2:00 PM  INFUSION CHAIR 18 [facility]        Today's Diagnoses     Other iron deficiency anemia    -  1    Supervision of other normal pregnancy, antepartum, [facility] CNM           Follow-ups after your visit        Your next 10 appointments already scheduled     German 15, 2018  8:15 AM CST   Return Obstetrics Extended with Gissel Kirby CNM   [facility] ([facility])    [facility]  [address]  [address]  [facility] [address]   [phone]            Jan 18, 2018 11:45 AM CST   (Arrive by 11:30 AM)   MFM US COMPRE SINGLE F/U with [id number]   [facility]ound - [facility] ([facility])    [address] [place] [address] [address]  [facility] [id number] [phone]           Wear comfortable clothes and leave your valuables at home.            Jan 18, 2018 12:15 PM CST   Radiology MD with [facility] MD   [facility] - [facility] ([facility])    [address] [place] [address] [address]  [facility] [id number] [phone]           Please arrive at the time given for your first appointment. This visit is used internally to schedule the physician's time during your ultrasound.            Jan 18, 2018  2:00 PM CST   Level 2 with  INFUSION CHAIR 7   [facility] ([facility])    [facility]  [address]  [facility] [address]   [phone]            Jan 21, 2018  7:30 AM CST   Level 2 with  INFUSION CHAIR 13   [facility] ([facility])    [facility]  [address]  [place] [address] [phone]              Who to contact     If you have questions or need follow up information about today's clinic visit or your schedule please contact [facility] directly at [phone].  Normal or non-critical lab and imaging results will be communicated to you by MyChart, letter or phone within 4 business days after the clinic has received the results. If you do not hear from us within 7 days, please contact the clinic through eMazeMehart or phone. If you have a critical or abnormal lab result, we will notify you by phone as soon as possible.  Submit refill requests through [employer] or call your pharmacy and they will forward the refill request to us. Please allow 3 business days for your refill to be completed.          Additional Information About Your Visit        MyChart Information     [employer] gives you secure access to your electronic health record. If you see a primary care provider, you can also send messages to your care team and make appointments. If you have questions, please call your primary care clinic.  If you do not have a primary care provider, please call [phone] and they will assist you.        Care EveryWhere ID     This is your Care EveryWhere ID. This could be used by other organizations to access your [place] medical records  [id number]        Your Vitals Were     Pulse Temperature Respirations Last Period          97 98.1  F (36.7  C) (Oral) 16 06/28/2017 (Exact Date)         Blood Pressure from Last 3 Encounters:   01/12/18 132/69   12/18/17 129/79   11/20/17 126/76    Weight from Last 3 Encounters:   12/18/17 96 kg (211 lb 9.6 oz)   11/20/17 93.8 kg (206 lb 12.8 oz)   10/16/17 92.2 kg (203 lb 3.2 oz)              Today, you had the following     No orders found for display       Primary Care Provider Office Phone # Fax #    Xiomara Smith [phone]       [address] [place] [address]  [address]  [facility] [address]        Equal Access to Services     [facility] JEWEL : Hadii aad ku hadbirgitdemetrius Cynthiaali, wanusratda luqadaha, qaybta cristhianmarkmarichuy keating. So [facility] [phone].    ATENCIÓN: Si habla español, tiene a alston disposición servicios gratuitos de asistencia lingüística. Brenna al [phone].    We comply with applicable federal civil rights laws and [place] laws. We do not discriminate on the basis of race, color, national origin, age, disability, sex, sexual orientation, or gender identity.            Thank you!     Thank you for choosing [facility] AND [facility]ENTER  for your care. Our goal is always to provide you with excellent care. Hearing back from our patients is one way we can continue to improve our services. Please take a few minutes to complete the written survey that you may receive in the mail after your visit with us. Thank you!             Your Updated Medication List - Protect others around you: Learn how to safely use, store and throw away your medicines at www.disposemymeds.org.          This list is accurate as of: 1/12/18  3:14 PM.  Always use your most recent med list.                   Brand Name Dispense Instructions for use Diagnosis    ascorbic acid 250 MG Chew chewable tablet    vitamin C    90 tablet    Take 1 tablet (250 mg) by mouth daily    Supervision of other normal pregnancy, antepartum, Other iron deficiency anemia       aspirin 81 MG tablet     120 tablet    Take 1 tablet (81 mg) by mouth daily    History of pre-eclampsia       cholecalciferol 5000 UNITS Caps capsule    vitamin D3    90 capsule    Take 1 capsule (5,000 Units) by mouth daily Take one capsule daily.    Vitamin D deficiency       Cyanocobalamin 1000 MCG Caps     30 capsule    Take 1 tablet by mouth daily    Supervision of other normal pregnancy, antepartum, Other iron deficiency anemia       docusate sodium 100 MG tablet    COLACE    60 tablet    Take  100 mg by mouth daily    Supervision of other normal pregnancy, antepartum, Other iron deficiency anemia       ferrous sulfate 325 (65 FE) MG tablet    IRON    60 tablet    Take 1 tablet (325 mg) by mouth daily (with breakfast)    Supervision of other normal pregnancy, antepartum, Other iron deficiency anemia       loratadine 10 MG tablet    CLARITIN    30 tablet    Take 1 tablet (10 mg) by mouth daily    Acute seasonal allergic rhinitis due to pollen       oseltamivir 75 MG capsule    TAMIFLU    10 capsule    Take 1 capsule (75 mg) by mouth daily    Exposure to influenza       prenatal multivitamin plus iron 27-0.8 MG Tabs per tablet      Take 1 tablet by mouth daily        pyridOXINE 25 MG tablet    vitamin B-6    60 tablet    Take 1 tablet (25 mg) by mouth 3 times daily    Nausea       sertraline 100 MG tablet    ZOLOFT    90 tablet    Take 1 tablet (100 mg) by mouth daily    SOLANGE (generalized anxiety disorder)

## 2018-01-15 ENCOUNTER — TELEPHONE (OUTPATIENT)
Dept: OBGYN | Facility: CLINIC | Age: 33
End: 2018-01-15

## 2018-01-15 ENCOUNTER — OFFICE VISIT (OUTPATIENT)
Dept: OBGYN | Facility: CLINIC | Age: 33
End: 2018-01-15
Attending: ADVANCED PRACTICE MIDWIFE
Payer: MEDICAID

## 2018-01-15 VITALS
HEART RATE: 96 BPM | WEIGHT: 216 LBS | DIASTOLIC BLOOD PRESSURE: 74 MMHG | SYSTOLIC BLOOD PRESSURE: 126 MMHG | BODY MASS INDEX: 34.72 KG/M2 | HEIGHT: 66 IN

## 2018-01-15 DIAGNOSIS — D64.9 ANEMIA, UNSPECIFIED TYPE: ICD-10-CM

## 2018-01-15 DIAGNOSIS — Z98.891 HISTORY OF C-SECTION: ICD-10-CM

## 2018-01-15 DIAGNOSIS — Z34.80 SUPERVISION OF OTHER NORMAL PREGNANCY, ANTEPARTUM: Primary | ICD-10-CM

## 2018-01-15 DIAGNOSIS — Z34.90 SUPERVISION OF NORMAL PREGNANCY, ANTEPARTUM: Primary | ICD-10-CM

## 2018-01-15 PROBLEM — R73.09 ELEVATED GLUCOSE TOLERANCE TEST: Status: ACTIVE | Noted: 2018-01-15

## 2018-01-15 LAB
DEPRECATED CALCIDIOL+CALCIFEROL SERPL-MC: 44 UG/L (ref 20–75)
GLUCOSE 1H P 50 G GLC PO SERPL-MCNC: 154 MG/DL (ref 60–129)
T PALLIDUM IGG+IGM SER QL: NEGATIVE

## 2018-01-15 PROCEDURE — 36415 COLL VENOUS BLD VENIPUNCTURE: CPT | Performed by: ADVANCED PRACTICE MIDWIFE

## 2018-01-15 PROCEDURE — 90471 IMMUNIZATION ADMIN: CPT

## 2018-01-15 PROCEDURE — 82950 GLUCOSE TEST: CPT | Performed by: ADVANCED PRACTICE MIDWIFE

## 2018-01-15 PROCEDURE — 82306 VITAMIN D 25 HYDROXY: CPT | Performed by: ADVANCED PRACTICE MIDWIFE

## 2018-01-15 PROCEDURE — G0463 HOSPITAL OUTPT CLINIC VISIT: HCPCS | Mod: 25

## 2018-01-15 PROCEDURE — 25000128 H RX IP 250 OP 636: Mod: ZF

## 2018-01-15 PROCEDURE — 86780 TREPONEMA PALLIDUM: CPT | Performed by: ADVANCED PRACTICE MIDWIFE

## 2018-01-15 PROCEDURE — 90715 TDAP VACCINE 7 YRS/> IM: CPT | Mod: ZF

## 2018-01-15 ASSESSMENT — PAIN SCALES - GENERAL: PAINLEVEL: NO PAIN (0)

## 2018-01-15 NOTE — PROGRESS NOTES
32 year old, , 28w5d, presents for EOB.    Patient concerns: Feeling well overall. Has been tired with her work schedule- has been considering reducing hours. Getting IV iron infusions- 1st was on , has  and  scheduled. Growth ultrasound scheduled on 18 with MFM in [facility] d/t marginal cord insertion.  Has not had another hsv outbreak since September, no prodromal symptoms. Pt agreeable to suppression at 35-36 weeks. Has been taking daily aspirin for pre-e prevention.    BP tour scheduled for 18.    Denies cramping/contractions, vaginal bleeding, discharge or leakage of fluid. Reports +fetal movement. No HA, vision changes, ruq/epigastric pain.    Education completed today includes breast feeding, [facility] hand out , contraception, counting movements, signs of pre-term labor, when to present to birthplace, post partum depression, GBS, getting enough iron and labor induction.    Birth preferences reviewed: Medicated; desires epidural.  Strongly desires TOLAC; has had one successful . Desire to sign consent today. Grobmans 79.2%.  Had epidural with last baby.     Desires Skin to skin, delayed cord clamping, dad to cut cord.  Agreeable IV saline lock and AMTSL.    Labor support:  , mom, sisters     Feeding plans:  Breastfeeding   1st baby for 4 months,  2nd 1-2 months     Contraception planned:   Unsure, had mirena and nexplanon in the past before  baby   Ok with another pregnancy; NOT interested in tubal at this time as previously mentioned   Reviewed options and healthy pregnancy spacing recommendations    The following labs were ordered today:       GCT, Vitamin D and Anti-treponema  Water birth consent form was not given- not a candidate.    Blood type:   ABO   Date Value Ref Range Status   2017 B  Final     RH(D)   Date Value Ref Range Status   2017 Pos  Final     Antibody Screen   Date Value Ref Range Status   2017 Neg  Final   Rhogam   was not given.  TDAP  wasgiven.    GAD7= 3, PHQ9= 6    A/P:  Encounter Diagnosis   Name Primary?     Supervision of other normal pregnancy, antepartum, WHS CNM Yes     Orders Placed This Encounter   Procedures     Glucose 1 Hour     25- OH-Vitamin D     Anti Treponema       EOB education reviewed.  EOB labs ordered- 1 hour glucose, anti-trep, vitamin d. NO cbc at this time as pt Is receiving iron infusions.  Reviewed TOLAC consent. Grobman's 79.2%. Consent signed and placed for scanning.  Tdap given.  Plan hsv suppression at 35-36 weeks.  Growth ultrasound scheduled with [facility] on 1/18/18 for marginal cord insertion.  Has 2 more iron infusions on 1/18 and 1/21.  Needs repeat CBC at least 2 weeks after last iron infusion on 1/21/18.  Continue scheduled prenatal care, RTC in approx 3 weeks.    MART Head, SHASHAM

## 2018-01-15 NOTE — TELEPHONE ENCOUNTER
Spoke with Vincent and gave her directions for 3 hour glucose. She will come next Monday. Will have  schedule.    In regards to letter she is requesting from [facility]. She would like to cut back hours at work to 32 hours/week. 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 Will route to [facility].    Once letter is completed pt would like emailed to her gmail listed in chart.

## 2018-01-15 NOTE — LETTER
1/15/2018       RE: Vincent Burris  [address] [place] [address]     Dear Colleague,    Thank you for referring your patient, Vincent Burris, to the [facility]. Please see a copy of my visit note below.     32 year old, , 28w5d, presents for EOB.    Patient concerns: Feeling well overall. Has been tired with her work schedule- has been considering reducing hours. Getting IV iron infusions- 1st was on , has  and  scheduled. Growth ultrasound scheduled on 18 with MFM in [facility] d/t marginal cord insertion.  Has not had another hsv outbreak since September, no prodromal symptoms. Pt agreeable to suppression at 35-36 weeks. Has been taking daily aspirin for pre-e prevention.    BP tour scheduled for 18.    Denies cramping/contractions, vaginal bleeding, discharge or leakage of fluid. Reports +fetal movement. No HA, vision changes, ruq/epigastric pain.    Education completed today includes breast feeding, [facility] hand out , contraception, counting movements, signs of pre-term labor, when to present to birthplace, post partum depression, GBS, getting enough iron and labor induction.    Birth preferences reviewed: Medicated; desires epidural.  Strongly desires TOLAC; has had one successful . Desire to sign consent today. Grobmans 79.2%.  Had epidural with last baby.     Desires Skin to skin, delayed cord clamping, dad to cut cord.  Agreeable IV saline lock and AMTSL.    Labor support:  , mom, sisters     Feeding plans:  Breastfeeding   1st baby for 4 months,  2nd 1-2 months     Contraception planned:   Unsure, had mirena and nexplanon in the past before  baby   Ok with another pregnancy; NOT interested in tubal at this time as previously mentioned   Reviewed options and healthy pregnancy spacing recommendations    The following labs were ordered today:       GCT, Vitamin  D and Anti-treponema  Water birth consent form was not given- not a candidate.    Blood type:   ABO   Date Value Ref Range Status   08/28/2017 B  Final     RH(D)   Date Value Ref Range Status   08/28/2017 Pos  Final     Antibody Screen   Date Value Ref Range Status   08/28/2017 Neg  Final   Rhogam  was not given.  TDAP  wasgiven.    GAD7= 3, PHQ9= 6    A/P:  Encounter Diagnosis   Name Primary?     Supervision of other normal pregnancy, antepartum, WHS CNM Yes     Orders Placed This Encounter   Procedures     Glucose 1 Hour     25- OH-Vitamin D     Anti Treponema       EOB education reviewed.  EOB labs ordered- 1 hour glucose, anti-trep, vitamin d. NO cbc at this time as pt Is receiving iron infusions.  Reviewed TOLAC consent. Grobman's 79.2%. Consent signed and placed for scanning.  Tdap given.  Plan hsv suppression at 35-36 weeks.  Growth ultrasound scheduled with [facility] on 1/18/18 for marginal cord insertion.  Has 2 more iron infusions on 1/18 and 1/21.  Needs repeat CBC at least 2 weeks after last iron infusion on 1/21/18.  Continue scheduled prenatal care, RTC in approx 3 weeks.    MART Head, SHASHAM

## 2018-01-18 ENCOUNTER — INFUSION THERAPY VISIT (OUTPATIENT)
Dept: INFUSION THERAPY | Facility: CLINIC | Age: 33
End: 2018-01-18
Attending: INTERNAL MEDICINE
Payer: MEDICAID

## 2018-01-18 ENCOUNTER — OFFICE VISIT (OUTPATIENT)
Dept: MATERNAL FETAL MEDICINE | Facility: CLINIC | Age: 33
End: 2018-01-18
Attending: OBSTETRICS & GYNECOLOGY
Payer: MEDICAID

## 2018-01-18 ENCOUNTER — HOSPITAL ENCOUNTER (OUTPATIENT)
Dept: ULTRASOUND IMAGING | Facility: CLINIC | Age: 33
Discharge: HOME | End: 2018-01-18
Attending: OBSTETRICS & GYNECOLOGY | Admitting: OBSTETRICS & GYNECOLOGY
Payer: MEDICAID

## 2018-01-18 VITALS
TEMPERATURE: 98.8 F | SYSTOLIC BLOOD PRESSURE: 113 MMHG | HEART RATE: 95 BPM | RESPIRATION RATE: 16 BRPM | DIASTOLIC BLOOD PRESSURE: 70 MMHG

## 2018-01-18 DIAGNOSIS — O43.199 MARGINAL INSERTION OF UMBILICAL CORD AFFECTING MANAGEMENT OF MOTHER: ICD-10-CM

## 2018-01-18 DIAGNOSIS — O43.199 MARGINAL INSERTION OF UMBILICAL CORD AFFECTING MANAGEMENT OF MOTHER: Primary | ICD-10-CM

## 2018-01-18 DIAGNOSIS — Z34.80 SUPERVISION OF OTHER NORMAL PREGNANCY, ANTEPARTUM: ICD-10-CM

## 2018-01-18 DIAGNOSIS — D50.8 OTHER IRON DEFICIENCY ANEMIA: Primary | ICD-10-CM

## 2018-01-18 PROCEDURE — 25000128 H RX IP 250 OP 636: Performed by: MIDWIFE

## 2018-01-18 PROCEDURE — 96366 THER/PROPH/DIAG IV INF ADDON: CPT

## 2018-01-18 PROCEDURE — 76816 OB US FOLLOW-UP PER FETUS: CPT

## 2018-01-18 PROCEDURE — 96365 THER/PROPH/DIAG IV INF INIT: CPT

## 2018-01-18 RX ORDER — DIPHENHYDRAMINE HCL 25 MG
25 TABLET ORAL
Status: CANCELLED
Start: 2018-01-18

## 2018-01-18 RX ORDER — ACETAMINOPHEN 325 MG/1
650 TABLET ORAL
Status: CANCELLED
Start: 2018-01-18

## 2018-01-18 RX ADMIN — IRON SUCROSE 300 MG: 20 INJECTION, SOLUTION INTRAVENOUS at 14:28

## 2018-01-18 ASSESSMENT — PAIN SCALES - GENERAL: PAINLEVEL: NO PAIN (0)

## 2018-01-18 NOTE — PROGRESS NOTES
Infusion Nursing Note:  Vincent Burris presents today for venofer.    Patient seen by provider today: No   present during visit today: Not Applicable.    Note: N/A.    Intravenous Access:  Peripheral IV placed.    Treatment Conditions:  Not Applicable.      Post Infusion Assessment:  Patient tolerated infusion without incident.  Site patent and intact, free from redness, edema or discomfort.  No evidence of extravasations.  Access discontinued per protocol.    Discharge Plan:   Discharge instructions reviewed with: Patient.  Patient and/or family verbalized understanding of discharge instructions and all questions answered.  AVS to patient via [employer].  Patient will return 1/21/18 for next appointment.   Patient discharged in stable condition accompanied by: self.  Departure Mode: Ambulatory.    Mary Llamas RN

## 2018-01-18 NOTE — MR AVS SNAPSHOT
After Visit Summary   1/18/2018    Vincent Burris    MRN: [id number]           Patient Information     Date Of Birth          1985        Visit Information        Provider Department      1/18/2018 2:00 PM  INFUSION CHAIR 7 [facility]        Today's Diagnoses     Other iron deficiency anemia    -  1    Supervision of other normal pregnancy, antepartum, WHS CNM           Follow-ups after your visit        Your next 10 appointments already scheduled     Jan 21, 2018  7:30 AM CST   Level 2 with  INFUSION CHAIR 2   [facility] ([facility])    [facility] [place]  [address]  [place] [address] [phone]            Jan 22, 2018  7:30 AM CST   LAB with OP LAB UR   [facility] [place] ([facility])    [address]  [facility] [id number] [phone]           Please do not eat 10-12 hours before your appointment if you are coming in fasting for labs on lipids, cholesterol, or glucose (sugar). This does not apply to pregnant women. Water, hot tea and black coffee (with nothing added) are okay. Do not drink other fluids, diet soda or chew gum.            Feb 06, 2018  8:30 AM CST   RETURN OB with Gissel Kirby CNM   [facility] ([facility])    [facility]  [address]  [address]  [facility] [id number] [phone]              Who to contact     If you have questions or need follow up information about today's clinic visit or your schedule please contact [facility] directly at [phone].  Normal or non-critical lab and imaging results will be communicated to you by MyChart, letter or phone within 4 business days after the clinic has received the results. If you do not hear from us within 7 days,  please contact the clinic through LocoMobi or phone. If you have a critical or abnormal lab result, we will notify you by phone as soon as possible.  Submit refill requests through LocoMobi or call your pharmacy and they will forward the refill request to us. Please allow 3 business days for your refill to be completed.          Additional Information About Your Visit        FieldglassharProterro Information     LocoMobi gives you secure access to your electronic health record. If you see a primary care provider, you can also send messages to your care team and make appointments. If you have questions, please call your primary care clinic.  If you do not have a primary care provider, please call [phone] and they will assist you.        Care EveryWhere ID     This is your Care EveryWhere ID. This could be used by other organizations to access your [place] medical records  [id number]        Your Vitals Were     Pulse Temperature Respirations Last Period          95 98.8  F (37.1  C) (Oral) 16 06/28/2017 (Exact Date)         Blood Pressure from Last 3 Encounters:   01/18/18 113/70   01/15/18 126/74   01/12/18 121/68    Weight from Last 3 Encounters:   01/15/18 98 kg (216 lb)   12/18/17 96 kg (211 lb 9.6 oz)   11/20/17 93.8 kg (206 lb 12.8 oz)              Today, you had the following     No orders found for display       Primary Care Provider Office Phone # Fax #    Xiomara Mariela Smith [phone]       [address] [facility] [address]  [facility] [address]        Equal Access to Services     JASVIR HOLLOWAY AH: Hadii aad ku hadasho Soomaali, waaxda luqadaha, qaybta kaalmada adeegyada, marichuy bearden . So [facility] [phone].    ATENCIÓN: Si habla español, tiene a alston disposición servicios gratuitos de asistencia lingüística. Llame al [phone].    We comply with applicable federal civil rights laws and [place] laws. We do not discriminate on the basis of race, color, national origin,  age, disability, sex, sexual orientation, or gender identity.            Thank you!     Thank you for choosing [facility]ENTER  for your care. Our goal is always to provide you with excellent care. Hearing back from our patients is one way we can continue to improve our services. Please take a few minutes to complete the written survey that you may receive in the mail after your visit with us. Thank you!             Your Updated Medication List - Protect others around you: Learn how to safely use, store and throw away your medicines at www.disposemymeds.org.          This list is accurate as of: 1/18/18  4:45 PM.  Always use your most recent med list.                   Brand Name Dispense Instructions for use Diagnosis    ascorbic acid 250 MG Chew chewable tablet    vitamin C    90 tablet    Take 1 tablet (250 mg) by mouth daily    Supervision of other normal pregnancy, antepartum, Other iron deficiency anemia       aspirin 81 MG tablet     120 tablet    Take 1 tablet (81 mg) by mouth daily    History of pre-eclampsia       cholecalciferol 5000 UNITS Caps capsule    vitamin D3    90 capsule    Take 1 capsule (5,000 Units) by mouth daily Take one capsule daily.    Vitamin D deficiency       docusate sodium 100 MG tablet    COLACE    60 tablet    Take 100 mg by mouth daily    Supervision of other normal pregnancy, antepartum, Other iron deficiency anemia       ferrous sulfate 325 (65 FE) MG tablet    IRON    60 tablet    Take 1 tablet (325 mg) by mouth daily (with breakfast)    Supervision of other normal pregnancy, antepartum, Other iron deficiency anemia       loratadine 10 MG tablet    CLARITIN    30 tablet    Take 1 tablet (10 mg) by mouth daily    Acute seasonal allergic rhinitis due to pollen       oseltamivir 75 MG capsule    TAMIFLU    10 capsule    Take 1 capsule (75 mg) by mouth daily    Exposure to influenza       prenatal multivitamin plus iron 27-0.8 MG Tabs per tablet       Take 1 tablet by mouth daily        sertraline 100 MG tablet    ZOLOFT    90 tablet    Take 1 tablet (100 mg) by mouth daily    SOLANGE (generalized anxiety disorder)

## 2018-01-18 NOTE — MR AVS SNAPSHOT
After Visit Summary   1/18/2018    Vincent Burris    MRN: [id number]           Patient Information     Date Of Birth          1985        Visit Information        Provider Department      1/18/2018 12:15 PM Agustina Luna,  [facility] Maternal Fetal Medicine  [place]        Today's Diagnoses     Marginal insertion of umbilical cord affecting management of mother    -  1       Follow-ups after your visit        Your next 10 appointments already scheduled     Feb 06, 2018  8:30 AM CST   RETURN OB with Gissel Kirby CNM   [facility] ([facility])    [place] [facility] [address]  [address]  [facility] [address] [phone]              Who to contact     If you have questions or need follow up information about today's clinic visit or your schedule please contact [facility] directly at [phone].  Normal or non-critical lab and imaging results will be communicated to you by Medalogixhart, letter or phone within 4 business days after the clinic has received the results. If you do not hear from us within 7 days, please contact the clinic through TwentyFeett or phone. If you have a critical or abnormal lab result, we will notify you by phone as soon as possible.  Submit refill requests through Tripology or call your pharmacy and they will forward the refill request to us. Please allow 3 business days for your refill to be completed.          Additional Information About Your Visit        Medalogixhart Information     Tripology gives you secure access to your electronic health record. If you see a primary care provider, you can also send messages to your care team and make appointments. If you have questions, please call your primary care clinic.  If you do not have a primary care provider, please call [phone] and they will assist you.        Care EveryWhere ID     This is your Care  EveryWhere ID. This could be used by other organizations to access your [facility] medical records  [id number]        Your Vitals Were     Last Period                   06/28/2017 (Exact Date)            Blood Pressure from Last 3 Encounters:   01/21/18 122/76   01/18/18 113/70   01/15/18 126/74    Weight from Last 3 Encounters:   01/15/18 98 kg (216 lb)   12/18/17 96 kg (211 lb 9.6 oz)   11/20/17 93.8 kg (206 lb 12.8 oz)              Today, you had the following     No orders found for display       Primary Care Provider Office Phone # Fax #    Xiomara Mariela Smith [phone]       [address] [place] [address] [address] [place] [address]        Equal Access to Services     JASVIR HOLLOWAY : Hadii holly moeo Soned, waaxda luqadaha, qaybta kaalmada adeegyada, marichuy bearden . So [facility] [phone].    ATENCIÓN: Si habla español, tiene a alston disposición servicios gratuitos de asistencia lingüística. Llame al [phone].    We comply with applicable federal civil rights laws and [place] laws. We do not discriminate on the basis of race, color, national origin, age, disability, sex, sexual orientation, or gender identity.            Thank you!     Thank you for choosing [facility]  for your care. Our goal is always to provide you with excellent care. Hearing back from our patients is one way we can continue to improve our services. Please take a few minutes to complete the written survey that you may receive in the mail after your visit with us. Thank you!             Your Updated Medication List - Protect others around you: Learn how to safely use, store and throw away your medicines at www.disposemymeds.org.          This list is accurate as of: 1/18/18 11:59 PM.  Always use your most recent med list.                   Brand Name Dispense Instructions for use Diagnosis    ascorbic acid 250 MG Chew chewable tablet    vitamin C    90  tablet    Take 1 tablet (250 mg) by mouth daily    Supervision of other normal pregnancy, antepartum, Other iron deficiency anemia       aspirin 81 MG tablet     120 tablet    Take 1 tablet (81 mg) by mouth daily    History of pre-eclampsia       cholecalciferol 5000 UNITS Caps capsule    vitamin D3    90 capsule    Take 1 capsule (5,000 Units) by mouth daily Take one capsule daily.    Vitamin D deficiency       docusate sodium 100 MG tablet    COLACE    60 tablet    Take 100 mg by mouth daily    Supervision of other normal pregnancy, antepartum, Other iron deficiency anemia       ferrous sulfate 325 (65 FE) MG tablet    IRON    60 tablet    Take 1 tablet (325 mg) by mouth daily (with breakfast)    Supervision of other normal pregnancy, antepartum, Other iron deficiency anemia       loratadine 10 MG tablet    CLARITIN    30 tablet    Take 1 tablet (10 mg) by mouth daily    Acute seasonal allergic rhinitis due to pollen       oseltamivir 75 MG capsule    TAMIFLU    10 capsule    Take 1 capsule (75 mg) by mouth daily    Exposure to influenza       prenatal multivitamin plus iron 27-0.8 MG Tabs per tablet      Take 1 tablet by mouth daily        sertraline 100 MG tablet    ZOLOFT    90 tablet    Take 1 tablet (100 mg) by mouth daily    SOLANGE (generalized anxiety disorder)

## 2018-01-21 ENCOUNTER — INFUSION THERAPY VISIT (OUTPATIENT)
Dept: INFUSION THERAPY | Facility: CLINIC | Age: 33
End: 2018-01-21
Attending: INTERNAL MEDICINE
Payer: MEDICAID

## 2018-01-21 VITALS
HEART RATE: 100 BPM | SYSTOLIC BLOOD PRESSURE: 122 MMHG | TEMPERATURE: 98.1 F | RESPIRATION RATE: 16 BRPM | DIASTOLIC BLOOD PRESSURE: 76 MMHG

## 2018-01-21 DIAGNOSIS — D50.8 OTHER IRON DEFICIENCY ANEMIA: Primary | ICD-10-CM

## 2018-01-21 DIAGNOSIS — Z34.80 SUPERVISION OF OTHER NORMAL PREGNANCY, ANTEPARTUM: ICD-10-CM

## 2018-01-21 PROBLEM — D64.9 ANEMIA: Status: ACTIVE | Noted: 2018-01-21

## 2018-01-21 PROBLEM — Z98.891 HISTORY OF C-SECTION: Status: ACTIVE | Noted: 2017-09-12

## 2018-01-21 PROCEDURE — 25000128 H RX IP 250 OP 636: Performed by: MIDWIFE

## 2018-01-21 PROCEDURE — 96366 THER/PROPH/DIAG IV INF ADDON: CPT

## 2018-01-21 PROCEDURE — 96365 THER/PROPH/DIAG IV INF INIT: CPT

## 2018-01-21 RX ORDER — DIPHENHYDRAMINE HCL 25 MG
25 TABLET ORAL
Status: CANCELLED
Start: 2018-01-21

## 2018-01-21 RX ORDER — ACETAMINOPHEN 325 MG/1
650 TABLET ORAL
Status: CANCELLED
Start: 2018-01-21

## 2018-01-21 RX ADMIN — IRON SUCROSE 300 MG: 20 INJECTION, SOLUTION INTRAVENOUS at 07:49

## 2018-01-21 NOTE — MR AVS SNAPSHOT
After Visit Summary   1/21/2018    Vincent Burris    MRN: [id number]           Patient Information     Date Of Birth          1985        Visit Information        Provider Department      1/21/2018 7:30 AM  INFUSION CHAIR 2 [facility]        Today's Diagnoses     Other iron deficiency anemia    -  1    Supervision of other normal pregnancy, antepartum, [facility] CNM           Follow-ups after your visit        Your next 10 appointments already scheduled     Jan 22, 2018  7:30 AM CST   LAB with OP LAB UR   [facility] [place], Laboratory Services ([facility])    [address]  [facility] [id number] [phone]           Please do not eat 10-12 hours before your appointment if you are coming in fasting for labs on lipids, cholesterol, or glucose (sugar). This does not apply to pregnant women. Water, hot tea and black coffee (with nothing added) are okay. Do not drink other fluids, diet soda or chew gum.            Feb 06, 2018  8:30 AM CST   RETURN OB with Gissel Kirby CNM   [facility] ([facility])    [place] [facility] [address]  [address]  [address]  [facility] [id number] [phone]              Who to contact     If you have questions or need follow up information about today's clinic visit or your schedule please contact [facility] directly at [phone].  Normal or non-critical lab and imaging results will be communicated to you by MyChart, letter or phone within 4 business days after the clinic has received the results. If you do not hear from us within 7 days, please contact the clinic through MyChart or phone. If you have a critical or abnormal lab result, we will notify you by phone as soon as possible.  Submit refill requests through AgileNano or call your pharmacy and they will forward the refill request  to us. Please allow 3 business days for your refill to be completed.          Additional Information About Your Visit        MyChart Information     HealthEnginehart gives you secure access to your electronic health record. If you see a primary care provider, you can also send messages to your care team and make appointments. If you have questions, please call your primary care clinic.  If you do not have a primary care provider, please call [phone] and they will assist you.        Care EveryWhere ID     This is your Care EveryWhere ID. This could be used by other organizations to access your [facility] medical records  [id number]        Your Vitals Were     Pulse Temperature Respirations Last Period          100 98.1  F (36.7  C) (Oral) 16 06/28/2017 (Exact Date)         Blood Pressure from Last 3 Encounters:   01/21/18 122/76   01/18/18 113/70   01/15/18 126/74    Weight from Last 3 Encounters:   01/15/18 98 kg (216 lb)   12/18/17 96 kg (211 lb 9.6 oz)   11/20/17 93.8 kg (206 lb 12.8 oz)              Today, you had the following     No orders found for display       Primary Care Provider Office Phone # Fax #    Xiomara Mariela Smith [phone]       [address] [place] [address] [address] [place] [address]        Equal Access to Services     JASVIR HOLLOWAY : Hadii holly ku hadasho Soomaali, waaxda luqadaha, qaybta kaalmada adeegyada, marichuy rubio haychris bearden . So [facility] [phone].    ATENCIÓN: Si habla español, tiene a alston disposición servicios gratuitos de asistencia lingüística. Llame al [phone].    We comply with applicable federal civil rights laws and [place] laws. We do not discriminate on the basis of race, color, national origin, age, disability, sex, sexual orientation, or gender identity.            Thank you!     Thank you for choosing [facility]  for your care. Our goal is always to provide you with excellent care. Hearing back from our  patients is one way we can continue to improve our services. Please take a few minutes to complete the written survey that you may receive in the mail after your visit with us. Thank you!             Your Updated Medication List - Protect others around you: Learn how to safely use, store and throw away your medicines at www.disposemymeds.org.          This list is accurate as of: 1/21/18  9:47 AM.  Always use your most recent med list.                   Brand Name Dispense Instructions for use Diagnosis    ascorbic acid 250 MG Chew chewable tablet    vitamin C    90 tablet    Take 1 tablet (250 mg) by mouth daily    Supervision of other normal pregnancy, antepartum, Other iron deficiency anemia       aspirin 81 MG tablet     120 tablet    Take 1 tablet (81 mg) by mouth daily    History of pre-eclampsia       cholecalciferol 5000 UNITS Caps capsule    vitamin D3    90 capsule    Take 1 capsule (5,000 Units) by mouth daily Take one capsule daily.    Vitamin D deficiency       docusate sodium 100 MG tablet    COLACE    60 tablet    Take 100 mg by mouth daily    Supervision of other normal pregnancy, antepartum, Other iron deficiency anemia       ferrous sulfate 325 (65 FE) MG tablet    IRON    60 tablet    Take 1 tablet (325 mg) by mouth daily (with breakfast)    Supervision of other normal pregnancy, antepartum, Other iron deficiency anemia       loratadine 10 MG tablet    CLARITIN    30 tablet    Take 1 tablet (10 mg) by mouth daily    Acute seasonal allergic rhinitis due to pollen       oseltamivir 75 MG capsule    TAMIFLU    10 capsule    Take 1 capsule (75 mg) by mouth daily    Exposure to influenza       prenatal multivitamin plus iron 27-0.8 MG Tabs per tablet      Take 1 tablet by mouth daily        sertraline 100 MG tablet    ZOLOFT    90 tablet    Take 1 tablet (100 mg) by mouth daily    SOLANGE (generalized anxiety disorder)

## 2018-01-21 NOTE — PROGRESS NOTES
Infusion Nursing Note:  Vincent Burris presents today for Venofer.    Patient seen by provider today: No   present during visit today: Not Applicable.    Note: N/A.    Intravenous Access:  Peripheral IV placed.    Treatment Conditions:  Not Applicable.      Post Infusion Assessment:  Patient tolerated infusion without incident.  Blood return noted pre and post infusion.  Site patent and intact, free from redness, edema or discomfort.  No evidence of extravasations.  Access discontinued per protocol.    Discharge Plan:   Discharge instructions reviewed with: Patient.  Patient and/or family verbalized understanding of discharge instructions and all questions answered.  AVS to patient via GridCraftHART.    Patient discharged in stable condition accompanied by: self.  Departure Mode: Ambulatory.    Hipolito Hare RN

## 2018-01-22 DIAGNOSIS — Z34.90 SUPERVISION OF NORMAL PREGNANCY, ANTEPARTUM: ICD-10-CM

## 2018-01-22 LAB
GLUCOSE 1H P 100 G GLC PO SERPL-MCNC: 163 MG/DL (ref 60–179)
GLUCOSE 2H P 100 G GLC PO SERPL-MCNC: 150 MG/DL (ref 60–154)
GLUCOSE 3H P 100 G GLC PO SERPL-MCNC: 114 MG/DL (ref 60–139)
GLUCOSE P FAST SERPL-MCNC: 93 MG/DL (ref 60–94)

## 2018-01-22 PROCEDURE — 36415 COLL VENOUS BLD VENIPUNCTURE: CPT | Performed by: ADVANCED PRACTICE MIDWIFE

## 2018-01-22 PROCEDURE — 82951 GLUCOSE TOLERANCE TEST (GTT): CPT | Performed by: ADVANCED PRACTICE MIDWIFE

## 2018-01-22 PROCEDURE — 82952 GTT-ADDED SAMPLES: CPT | Performed by: ADVANCED PRACTICE MIDWIFE

## 2018-01-23 NOTE — PROGRESS NOTES
"Please see \"Imaging\" tab under \"Chart Review\" for details of today's US.    Agustina Luna, DO    "

## 2018-01-24 ENCOUNTER — MYC MEDICAL ADVICE (OUTPATIENT)
Dept: OBGYN | Facility: CLINIC | Age: 33
End: 2018-01-24

## 2018-01-24 DIAGNOSIS — O43.193 MARGINAL INSERTION OF UMBILICAL CORD AFFECTING MANAGEMENT OF MOTHER IN THIRD TRIMESTER: Primary | ICD-10-CM

## 2018-01-24 NOTE — TELEPHONE ENCOUNTER
Review of M ultrasound confirms repeat growth ultrasound recommended at 34-35 weeks with assessment of the umbilical cord insertion (rule out velamentous).    Order entered and pt notified.

## 2018-05-06 ENCOUNTER — HOSPITAL ENCOUNTER (EMERGENCY)
Facility: CLINIC | Age: 33
Discharge: HOME | End: 2018-05-07
Attending: EMERGENCY MEDICINE | Admitting: EMERGENCY MEDICINE
Payer: COMMERCIAL

## 2018-05-06 VITALS
OXYGEN SATURATION: 97 % | BODY MASS INDEX: 33.99 KG/M2 | WEIGHT: 204 LBS | TEMPERATURE: 99.2 F | HEIGHT: 65 IN | SYSTOLIC BLOOD PRESSURE: 154 MMHG | DIASTOLIC BLOOD PRESSURE: 88 MMHG

## 2018-05-06 DIAGNOSIS — L02.411 ABSCESS OF AXILLA, RIGHT: ICD-10-CM

## 2018-05-06 PROCEDURE — 99283 EMERGENCY DEPT VISIT LOW MDM: CPT | Mod: 25

## 2018-05-06 PROCEDURE — 10060 I&D ABSCESS SIMPLE/SINGLE: CPT

## 2018-05-06 NOTE — ED AVS SNAPSHOT
Emergency Department    [address] [facility] [address]    Phone:  [phone]    Fax:  [phone]                                       Vincent Burris   MRN: [id number]    Department:   Emergency Department   Date of Visit:  5/6/2018           Patient Information     Date Of Birth          1985        Your diagnoses for this visit were:     Abscess of axilla, right        You were seen by Jai Linda MD.      Follow-up Information     Schedule an appointment as soon as possible for a visit with Lake Garcia MD.    Specialty:  Surgery    Contact information:    [address]  [facility] [address] [phone]          Follow up with  Emergency Department.    Specialty:  EMERGENCY MEDICINE    Why:  If symptoms worsen    Contact information:    [address] [facility] [id number] [phone]        Discharge Instructions         Abscess (Incision & Drainage)  An abscess is sometimes called a boil. It happens when bacteria get trapped under the skin and start to grow. Pus forms inside the abscess as the body responds to the bacteria. An abscess can happen with an insect bite, ingrown hair, blocked oil gland, pimple, cyst, or puncture wound.  Your healthcare provider has drained the pus from your abscess. If the abscess pocket was large, your healthcare provider may have put in gauze packing. Your provider will need to remove it on your next visit. He or she may also replace it at that time. You may not need antibiotics to treat a simple abscess, unless the infection is spreading into the skin around the wound (cellulitis).  The wound will take about 1 to 2 weeks to heal, depending on the size of the abscess. Healthy tissue will grow from the bottom and sides of the opening until it seals over.  Home care  These tips can help your wound heal:    The wound may drain for the first 2 days. Cover the wound with a clean dry dressing. Change  the dressing if it becomes soaked with blood or pus.    If a gauze packing was placed inside the abscess pocket, you may be told to remove it yourself. You may do this in the shower. Once the packing is removed, you should wash the area in the shower, or clean the area as directed by your provider. Continue to do this until the skin opening has closed. Make sure you wash your hands after changing the packing or cleaning the wound.    If you were prescribed antibiotics, take them as directed until they are all gone.    You may use acetaminophen or ibuprofen to control pain, unless another pain medicine was prescribed. If you have liver disease or ever had a stomach ulcer, talk with your doctor before using these medicines.  Follow-up care  Follow up with your healthcare provider, or as advised. If a gauze packing was put in your wound, it should be removed in 1 to 2 days. Check your wound every day for any signs that the infection is getting worse. The signs are listed below.  When to seek medical advice  Call your healthcare provider right away if any of these occur:    Increasing redness or swelling    Red streaks in the skin leading away from the wound    Increasing local pain or swelling    Continued pus draining from the wound 2 days after treatment    Fever of 100.4 F (38 C) or higher, or as directed by your healthcare provider    Boil returns when you are at home  Date Last Reviewed: 9/1/2016 2000-2017 [employer]. [address] [place] [address]. All rights reserved. This information is not intended as a substitute for professional medical care. Always follow your healthcare professional's instructions.          24 Hour Appointment Hotline       To make an appointment at any [facility], call [phone] ([phone]). If you don't have a family doctor or clinic, we will help you find one. [facility] clinics are conveniently located to serve the needs of you and your  family.             Review of your medicines      Our records show that you are taking the medicines listed below. If these are incorrect, please call your family doctor or clinic.        Dose / Directions Last dose taken    ascorbic acid 250 MG Chew chewable tablet   Commonly known as:  vitamin C   Dose:  250 mg   Quantity:  90 tablet        Take 1 tablet (250 mg) by mouth daily   Refills:  0        aspirin 81 MG tablet   Dose:  81 mg   Quantity:  120 tablet        Take 1 tablet (81 mg) by mouth daily   Refills:  1        cholecalciferol 5000 units Caps capsule   Commonly known as:  vitamin D3   Dose:  5000 Units   Quantity:  90 capsule        Take 1 capsule (5,000 Units) by mouth daily Take one capsule daily.   Refills:  3        docusate sodium 100 MG tablet   Commonly known as:  COLACE   Dose:  100 mg   Quantity:  60 tablet        Take 100 mg by mouth daily   Refills:  1        ferrous sulfate 325 (65 Fe) MG tablet   Commonly known as:  IRON   Dose:  325 mg   Quantity:  60 tablet        Take 1 tablet (325 mg) by mouth daily (with breakfast)   Refills:  2        loratadine 10 MG tablet   Commonly known as:  CLARITIN   Dose:  10 mg   Quantity:  30 tablet        Take 1 tablet (10 mg) by mouth daily   Refills:  1        oseltamivir 75 MG capsule   Commonly known as:  TAMIFLU   Dose:  75 mg   Quantity:  10 capsule        Take 1 capsule (75 mg) by mouth daily   Refills:  0        prenatal multivitamin plus iron 27-0.8 MG Tabs per tablet   Dose:  1 tablet        Take 1 tablet by mouth daily   Refills:  0        sertraline 100 MG tablet   Commonly known as:  ZOLOFT   Dose:  100 mg   Quantity:  90 tablet        Take 1 tablet (100 mg) by mouth daily   Refills:  3                Orders Needing Specimen Collection     None      Pending Results     No orders found for last 3 day(s).            Pending Culture Results     No orders found for last 3 day(s).            Pending Results Instructions     If you had any lab results  that were not finalized at the time of your Discharge, you can call the ED Lab Result RN at [phone]. You will be contacted by this team for any positive Lab results or changes in treatment. The nurses are available 7 days a week from 10A to 6:30P.  You can leave a message 24 hours per day and they will return your call.        Test Results From Your Hospital Stay               Clinical Quality Measure: Blood Pressure Screening     Your blood pressure was checked while you were in the emergency department today. The last reading we obtained was  BP: 154/88 . Please read the guidelines below about what these numbers mean and what you should do about them.  If your systolic blood pressure (the top number) is less than 120 and your diastolic blood pressure (the bottom number) is less than 80, then your blood pressure is normal. There is nothing more that you need to do about it.  If your systolic blood pressure (the top number) is 120-139 or your diastolic blood pressure (the bottom number) is 80-89, your blood pressure may be higher than it should be. You should have your blood pressure rechecked within a year by a primary care provider.  If your systolic blood pressure (the top number) is 140 or greater or your diastolic blood pressure (the bottom number) is 90 or greater, you may have high blood pressure. High blood pressure is treatable, but if left untreated over time it can put you at risk for heart attack, stroke, or kidney failure. You should have your blood pressure rechecked by a primary care provider within the next 4 weeks.  If your provider in the emergency department today gave you specific instructions to follow-up with your doctor or provider even sooner than that, you should follow that instruction and not wait for up to 4 weeks for your follow-up visit.        Thank you for choosing [facility]       Thank you for choosing [facility] for your care. Our goal is always to provide you with excellent care.  Hearing back from our patients is one way we can continue to improve our services. Please take a few minutes to complete the written survey that you may receive in the mail after you visit with us. Thank you!        Meludiahart Information     [employer] gives you secure access to your electronic health record. If you see a primary care provider, you can also send messages to your care team and make appointments. If you have questions, please call your primary care clinic.  If you do not have a primary care provider, please call [phone] and they will assist you.        Care EveryWhere ID     This is your Care EveryWhere ID. This could be used by other organizations to access your [facility] medical records  [id number]        Equal Access to Services     JASVIR HOLLOWAY : Boris Mcelroy, sara walker, venice tabares, marichuy adkins. So [facility] [phone].    ATENCIÓN: Si habla español, tiene a alston disposición servicios gratuitos de asistencia lingüística. Llame al [phone].    We comply with applicable federal civil rights laws and [place] laws. We do not discriminate on the basis of race, color, national origin, age, disability, sex, sexual orientation, or gender identity.            After Visit Summary       This is your record. Keep this with you and show to your community pharmacist(s) and doctor(s) at your next visit.

## 2018-05-06 NOTE — ED AVS SNAPSHOT
Emergency Department    [address] [facility] [address]    Phone:  [phone]    Fax:  [phone]                                       Vincent Burris   MRN: [id number]    Department:   Emergency Department   Date of Visit:  5/6/2018           After Visit Summary Signature Page     I have received my discharge instructions, and my questions have been answered. I have discussed any challenges I see with this plan with the nurse or doctor.    ..........................................................................................................................................  Patient/Patient Representative Signature      ..........................................................................................................................................  Patient Representative Print Name and Relationship to Patient    ..................................................               ................................................  Date                                            Time    ..........................................................................................................................................  Reviewed by Signature/Title    ...................................................              ..............................................  Date                                                            Time

## 2018-05-07 ASSESSMENT — ENCOUNTER SYMPTOMS
WOUND: 1
FEVER: 1

## 2018-05-07 NOTE — DISCHARGE INSTRUCTIONS
Abscess (Incision & Drainage)  An abscess is sometimes called a boil. It happens when bacteria get trapped under the skin and start to grow. Pus forms inside the abscess as the body responds to the bacteria. An abscess can happen with an insect bite, ingrown hair, blocked oil gland, pimple, cyst, or puncture wound.  Your healthcare provider has drained the pus from your abscess. If the abscess pocket was large, your healthcare provider may have put in gauze packing. Your provider will need to remove it on your next visit. He or she may also replace it at that time. You may not need antibiotics to treat a simple abscess, unless the infection is spreading into the skin around the wound (cellulitis).  The wound will take about 1 to 2 weeks to heal, depending on the size of the abscess. Healthy tissue will grow from the bottom and sides of the opening until it seals over.  Home care  These tips can help your wound heal:    The wound may drain for the first 2 days. Cover the wound with a clean dry dressing. Change the dressing if it becomes soaked with blood or pus.    If a gauze packing was placed inside the abscess pocket, you may be told to remove it yourself. You may do this in the shower. Once the packing is removed, you should wash the area in the shower, or clean the area as directed by your provider. Continue to do this until the skin opening has closed. Make sure you wash your hands after changing the packing or cleaning the wound.    If you were prescribed antibiotics, take them as directed until they are all gone.    You may use acetaminophen or ibuprofen to control pain, unless another pain medicine was prescribed. If you have liver disease or ever had a stomach ulcer, talk with your doctor before using these medicines.  Follow-up care  Follow up with your healthcare provider, or as advised. If a gauze packing was put in your wound, it should be removed in 1 to 2 days. Check your wound every day for any  signs that the infection is getting worse. The signs are listed below.  When to seek medical advice  Call your healthcare provider right away if any of these occur:    Increasing redness or swelling    Red streaks in the skin leading away from the wound    Increasing local pain or swelling    Continued pus draining from the wound 2 days after treatment    Fever of 100.4 F (38 C) or higher, or as directed by your healthcare provider    Boil returns when you are at home  Date Last Reviewed: 9/1/2016 2000-2017 [employer]. [address] [place], [place] [id number]. All rights reserved. This information is not intended as a substitute for professional medical care. Always follow your healthcare professional's instructions.

## 2018-05-07 NOTE — ED PROVIDER NOTES
"  History     Chief Complaint:  Abscess      The history is provided by the patient.      Vincent Burris is a 32 year old female who presents to the ED for evaluation of an abscess, which started a couple of days ago. The patient reports a history of boils. She comes in tonight with a boil under her right arm.  Her temperature prior to arrival in the ED was 100.2 F. She has tried ibuprofen and warm compresses without relief. She has discussed her case with surgeons previously; they have decided to withhold surgery. Of note, she is breastfeeding.      Allergies:  Zithromax [Azithromycin]    Medications:    The patient is not currently taking any prescribed medications.    Past Medical History:    Atypical chest pain   BMI 32.0-32.9,adult   Dental abscess   Former smoker   SOLANGE (generalized anxiety disorder)   Hidradenitis suppurativa   History of herpes genitalis   LGSIL on Pap smear of cervix   Recurrent major depressive disorder, in partial remission (H)     Past Surgical History:    Appendectomy  D&C    Family History:    No past pertinent family history.    Social History:  Presents with her significant other.   Former smoker: 0.10 ppd for 8 years, quit in 2011.  Negative for smokeless tobacco.   Positive for alcohol use.   Marital Status:  Single [1]     Review of Systems   Constitutional: Positive for fever.   Skin: Positive for wound.   All other systems reviewed and are negative.    Physical Exam   First Vitals:  BP: 154/88  Heart Rate: 92  Temp: 99.2  F (37.3  C)  Height: 165.1 cm (5' 5\")  Weight: 92.5 kg (204 lb)  SpO2: 97 %      Physical Exam  Nursing note and vitals reviewed.  Constitutional:  Oriented to person, place, and time. Cooperative.   HENT:   Nose:    Nose normal.   Mouth/Throat:   Mucous membranes are normal.   Eyes:    Conjunctivae normal and EOM are normal.      Pupils are equal, round, and reactive to light.   Neck:    Trachea normal.   Cardiovascular:  Normal rate, regular rhythm, " normal heart sounds and normal pulses. No murmur heard.  Pulmonary/Chest:  Effort normal and breath sounds normal.   Abdominal:   Soft. Normal appearance and bowel sounds are normal.      There is no tenderness.      There is no rebound and no CVA tenderness.   Musculoskeletal:  Extremities atraumatic x 4.   Lymphadenopathy:  No cervical adenopathy.   Neurological:   Alert and oriented to person, place, and time. Normal strength.      No cranial nerve deficit or sensory deficit. GCS eye subscore is 4. GCS verbal subscore is 5. GCS motor subscore is 6.   Skin:    Area of fluctuance and induration to right axilla which is tender to palpation.  Psychiatric:   Normal mood and affect.    Emergency Department Course   Procedures:    Procedure: Incision and Drainage   LOCATION:  Right axilla     ANESTHESIA:  Local field block using Marcaine 0.5% plain     PREPARATION:  Cleansed with Betadine     PROCEDURE:  Area was incised with # 11 Blade ([place]) with cruciate incision.  Wound treatment included Purulent Drainage and Expression of Material.  Packing consisted of Iodoform Gauze.  Appropriate dressing was applied to cover the area.    Patient Status:        Patient tolerated the procedure well. There were no complications.    Emergency Department Course:  Nursing notes and vitals reviewed. 0003 I performed an exam of the patient as documented above.     0010  I performed and I&D, see note above.     Findings and plan explained to the Patient. Patient discharged home with instructions regarding supportive care, medications, and reasons to return. The importance of close follow-up was reviewed.     Impression & Plan    Medical Decision Making:  Vincent Burris is a 32 year old female came in for further evaluation of an abscess to the right axilla.  She has had this previously as well.  While she had a fever earlier at home, she does not have one here, and she does not appear septic or toxic here.  There is also no  surrounding erythema to suggest that she has cellulitis in addition to the abscess.  I subsequently I & D'd the abscess per the above procedure note.  She knows to remove the packing on her own in 2 days.  She should return here though if her symptoms worsen.  I am providing her the contact information for the general surgery clinic as well.    Diagnosis:    ICD-10-CM   1. Abscess of axilla, right L02.411       Disposition:  discharged to home    I, Sabrina Caldwell, am serving as a scribe on 5/7/2018 at 12:03 AM to personally document services performed by Jai Linda MD based on my observations and the provider's statements to me.       Sabrina Caldwell  5/6/2018    EMERGENCY DEPARTMENT       Jai Linda MD  05/07/18 0129

## 2018-11-29 ENCOUNTER — OFFICE VISIT (OUTPATIENT)
Dept: UROLOGY | Facility: CLINIC | Age: 33
End: 2018-11-29
Payer: COMMERCIAL

## 2018-11-29 VITALS
DIASTOLIC BLOOD PRESSURE: 60 MMHG | OXYGEN SATURATION: 97 % | SYSTOLIC BLOOD PRESSURE: 112 MMHG | HEART RATE: 98 BPM | BODY MASS INDEX: 35.99 KG/M2 | WEIGHT: 216 LBS | HEIGHT: 65 IN

## 2018-11-29 DIAGNOSIS — E66.01 MORBID OBESITY (H): ICD-10-CM

## 2018-11-29 DIAGNOSIS — N20.0 KIDNEY STONE: ICD-10-CM

## 2018-11-29 DIAGNOSIS — R10.9 RIGHT FLANK PAIN: Primary | ICD-10-CM

## 2018-11-29 DIAGNOSIS — N20.0 KIDNEY STONE: Primary | ICD-10-CM

## 2018-11-29 LAB
ALBUMIN UR-MCNC: 30 MG/DL
APPEARANCE UR: CLEAR
BILIRUB UR QL STRIP: ABNORMAL
COLOR UR AUTO: YELLOW
GLUCOSE UR STRIP-MCNC: NEGATIVE MG/DL
HGB UR QL STRIP: ABNORMAL
KETONES UR STRIP-MCNC: ABNORMAL MG/DL
LEUKOCYTE ESTERASE UR QL STRIP: ABNORMAL
NITRATE UR QL: NEGATIVE
PH UR STRIP: 7 PH (ref 5–7)
SOURCE: ABNORMAL
SP GR UR STRIP: 1.02 (ref 1–1.03)
UROBILINOGEN UR STRIP-ACNC: 1 EU/DL (ref 0.2–1)

## 2018-11-29 PROCEDURE — 81003 URINALYSIS AUTO W/O SCOPE: CPT | Performed by: UROLOGY

## 2018-11-29 PROCEDURE — 99204 OFFICE O/P NEW MOD 45 MIN: CPT | Performed by: UROLOGY

## 2018-11-29 NOTE — MR AVS SNAPSHOT
"              After Visit Summary   11/29/2018    Vincent Burris    MRN: [id number]           Patient Information     Date Of Birth          1985        Visit Information        Provider Department      11/29/2018 10:45 AM Reji Gannon MD [facility] [place]        Today's Diagnoses     Right flank pain    -  1    Morbid obesity (H)           Follow-ups after your visit        Who to contact     If you have questions or need follow up information about today's clinic visit or your schedule please contact [facility] [place] directly at [phone].  Normal or non-critical lab and imaging results will be communicated to you by MyChart, letter or phone within 4 business days after the clinic has received the results. If you do not hear from us within 7 days, please contact the clinic through [employer] or phone. If you have a critical or abnormal lab result, we will notify you by phone as soon as possible.  Submit refill requests through [employer] or call your pharmacy and they will forward the refill request to us. Please allow 3 business days for your refill to be completed.          Additional Information About Your Visit        MyChart Information     [employer] gives you secure access to your electronic health record. If you see a primary care provider, you can also send messages to your care team and make appointments. If you have questions, please call your primary care clinic.  If you do not have a primary care provider, please call [phone] and they will assist you.        Care EveryWhere ID     This is your Care EveryWhere ID. This could be used by other organizations to access your [place] medical records  [id number]        Your Vitals Were     Pulse Height Pulse Oximetry BMI (Body Mass Index)          98 1.651 m (5' 5\") 97% 35.94 kg/m2         Blood Pressure from Last 3 Encounters:   11/29/18 112/60   05/06/18 154/88 "   01/21/18 122/76    Weight from Last 3 Encounters:   11/29/18 98 kg (216 lb)   05/06/18 92.5 kg (204 lb)   01/15/18 98 kg (216 lb)              Today, you had the following     No orders found for display       Primary Care Provider Office Phone # Fax #    Dinorah Sanchez [phone]       [facility] [address]  [address] [facility] [address]        Equal Access to Services     JASVIR HOLLOWAY : Hadii aad ku hadasho Soomaali, waaxda luqadaha, qaybta kaalmada adeegyada, waxay idiin hayaan adeeg kharash la'margaretn . [facility] [phone].    ATENCIÓN: Si habla español, tiene a alston disposición servicios gratuitos de asistencia lingüística. [facility] [phone].    We comply with applicable federal civil rights laws and [place] laws. We do not discriminate on the basis of race, color, national origin, age, disability, sex, sexual orientation, or gender identity.            Thank you!     Thank you for choosing [facility] [place]  for your care. Our goal is always to provide you with excellent care. Hearing back from our patients is one way we can continue to improve our services. Please take a few minutes to complete the written survey that you may receive in the mail after your visit with us. Thank you!             Your Updated Medication List - Protect others around you: Learn how to safely use, store and throw away your medicines at www.disposemymeds.org.          This list is accurate as of 11/29/18 11:04 PM.  Always use your most recent med list.                   Brand Name Dispense Instructions for use Diagnosis    ascorbic acid 250 MG Chew chewable tablet    vitamin C    90 tablet    Take 1 tablet (250 mg) by mouth daily    Supervision of other normal pregnancy, antepartum, Other iron deficiency anemia       aspirin 81 MG tablet    ASA    120 tablet    Take 1 tablet (81 mg) by mouth daily    History of pre-eclampsia       cholecalciferol 5000 units (125 mcg)  capsule    VITAMIN D3    90 capsule    Take 1 capsule (5,000 Units) by mouth daily Take one capsule daily.    Vitamin D deficiency       docusate sodium 100 MG tablet    COLACE    60 tablet    Take 100 mg by mouth daily    Supervision of other normal pregnancy, antepartum, Other iron deficiency anemia       ferrous sulfate 325 (65 Fe) MG tablet    FEROSUL    60 tablet    Take 1 tablet (325 mg) by mouth daily (with breakfast)    Supervision of other normal pregnancy, antepartum, Other iron deficiency anemia       loratadine 10 MG tablet    CLARITIN    30 tablet    Take 1 tablet (10 mg) by mouth daily    Acute seasonal allergic rhinitis due to pollen       oseltamivir 75 MG capsule    TAMIFLU    10 capsule    Take 1 capsule (75 mg) by mouth daily    Exposure to influenza       prenatal multivitamin w/iron 27-0.8 MG tablet      Take 1 tablet by mouth daily        sertraline 100 MG tablet    ZOLOFT    90 tablet    Take 1 tablet (100 mg) by mouth daily    SOLANGE (generalized anxiety disorder)

## 2018-11-29 NOTE — LETTER
"2018       RE: Vincent Burris  [address] [place] [address]     Dear Colleague,    Thank you for referring your patient, Vincent Burris, to the [facility] [place] at [facility]. Please see a copy of my visit note below.    History: This is a great pleasure to see this very pleasant 33-year-old lady the first time today.  She is presenting with severe intermittent right-sided flank pain.  An ultrasound of the kidney had shown moderate hydronephrosis of the right kidney  She is  9 para 8.  She is 21 weeks pregnant at this time.  There is no apparent previous history of urinary stone disease.  No other imaging has been done because of her pregnancy.  She has had some intermittent nausea but currently is managing pain only with Tylenol.  Urinalysis shows large amount of blood in the urine but no evidence of urinary tract infection    Past Medical History:   Diagnosis Date     Atypical chest pain     ekg and workup normla in er     BMI 32.0-32.9,adult 2017     Dental abscess     seen in [place] for denta lpain and had a periapical abscess given antibiotics, pain meds and a dental block     Former smoker      SOLANGE (generalized anxiety disorder) 6/15/2016     Hidradenitis suppurativa      History of herpes genitalis      LGSIL on Pap smear of cervix      Recurrent major depressive disorder, in partial remission (H)     on lexapro in past , then sertraline , later well butrin added        Past Surgical History:   Procedure Laterality Date     APPENDECTOMY       C/SECTION, LOW TRANSVERSE  10/31/ 2005    \"Elaina \"     COLPOSCOPY, BIOPSY, COMBINED  2005    + pap, bx 12 & 6 o clock,ECC: FAYE I     DILATION AND CURETTAGE SUCTION, TREAT INCOMPLETE   2009     VAG DELIV ONLY,PREV C-SECTN      \" Pedro\"       Family History   Problem Relation Age of Onset     Breast Cancer Maternal Grandmother  "     Diabetes Maternal Grandfather      KIDNEY DISEASE Paternal Grandmother      Skin Cancer No family hx of      Melanoma No family hx of      Hypertension No family hx of      Coronary Artery Disease No family hx of      Cerebrovascular Disease No family hx of      Other Cancer No family hx of        Social History     Social History     Marital status: Single     Spouse name: N/A     Number of children: N/A     Years of education: N/A     Occupational History     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 [place]      Social History Main Topics     Smoking status: Former Smoker     Packs/day: 0.10     Years: 8.00     Types: Cigarettes     Quit date: 2011     Smokeless tobacco: Never Used     Alcohol use 3.0 oz/week     5 Standard drinks or equivalent per week     Drug use: No     Sexual activity: Yes     Partners: Male     Other Topics Concern     Not on file     Social History Narrative    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        Current Outpatient Prescriptions   Medication Sig Dispense Refill     Prenatal Vit-Fe Fumarate-FA (PRENATAL MULTIVITAMIN PLUS IRON) 27-0.8 MG TABS per tablet Take 1 tablet by mouth daily       sertraline (ZOLOFT) 100 MG tablet Take 1 tablet (100 mg) by mouth daily 90 tablet 3     ascorbic acid (VITAMIN C) 250 MG CHEW chewable tablet Take 1 tablet (250 mg) by mouth daily (Patient not taking: Reported on 2018) 90 tablet 0     aspirin 81 MG tablet Take 1 tablet (81 mg) by mouth daily (Patient not taking: Reported on 2018) 120 tablet 1     cholecalciferol (VITAMIN D3) 5000 UNITS CAPS capsule Take 1 capsule (5,000 Units) by mouth daily Take one capsule daily. (Patient not taking: Reported on 2018) 90 capsule 3     docusate sodium (COLACE) 100 MG tablet Take 100 mg by mouth daily (Patient  "not taking: Reported on 1/15/2018) 60 tablet 1     ferrous sulfate (IRON) 325 (65 FE) MG tablet Take 1 tablet (325 mg) by mouth daily (with breakfast) (Patient not taking: Reported on 1/15/2018) 60 tablet 2     loratadine (CLARITIN) 10 MG tablet Take 1 tablet (10 mg) by mouth daily 30 tablet 1     oseltamivir (TAMIFLU) 75 MG capsule Take 1 capsule (75 mg) by mouth daily (Patient not taking: Reported on 12/18/2017) 10 capsule 0       Review Of Systems:  Skin: There is a history of hidradenitis suppurativa  Eyes: negative  Ears/Nose/Throat: negative  Respiratory: No shortness of breath, dyspnea on exertion, cough, or hemoptysis  Cardiovascular: negative  Gastrointestinal: negative  Genitourinary: negative  Musculoskeletal: negative  Neurologic: negative  Psychiatric: negative and depression stable  Hematologic/Lymphatic/Immunologic: negative  Endocrine: negative    Exam:  /60 (BP Location: Left arm, Patient Position: Sitting, Cuff Size: Adult Large)  Pulse 98  Ht 1.651 m (5' 5\")  Wt 98 kg (216 lb)  SpO2 97%  BMI 35.94 kg/m2    General Impression: Very pleasant lady who is in mild distress at this time who is otherwise quite conversational and well oriented to time place and person.    Mental status.  Normal    HEENT: There is no clinical evidence of jaundice, mucous membranes are normal    Skin: Skin is otherwise normal at this time    Lymph Nodes: Not examined    Respiratory System: The respiratory cycle is normal    Cardiovascular: There is no significant peripheral pitting edema    Abdominal: Gravid uterus extending above the pubic bone    Extremities: Extremities otherwise unremarkable    Back and Flank: Significant increase in tenderness in right flank as compared to the left    Genital: Not examined    Rectal: Not examined    Neurologic: There are no focal abnormal clinical neurological signs in central, or peripheral nervous systems    Impression: The patient most likely has a stone in the right " ureter with right hydronephrosis.  She is also 21 weeks pregnant.  This situation is always a challenging one to address.  We are very limited in imaging options at present time.  I will attempt to contact her obstetrician's about whether we would be able to at least do a KUB to see if there is any evidence of stone.  We will definitely need to rule out a CT scan at this time.  Clinical options would be firstly to hope that the stone will pass clinically and we can also have discussions with her obstetrician about whether we can use tamsulosin to help facilitate passage of the stone.  Other options include the placement of a stent to relieve pain but our experience usually is that placing stents in women during pregnancy is very challenging, very uncomfortable and the encrusted very quickly and at this stage the patient would likely require to have a stent exchanged to a possibly even 3 times before she comes to term.  A percutaneous nephrostomy tube could be placed to relieve pain and obstruction of the kidney but this would need to stay in place until she delivers for definitive management of the stone to place.  Finally, if the stone does not pass and we can do a KUB which identifies a stone in the ureter we can consider a ureteroscopy with holmium laser treatment with removal of fragments and then placement of a stent for only about a week before its removal in the office.  This type of surgery would be done very carefully with the fetus shielded from any potential fluoroscopic effects in the operating room and with minimal use of fluoroscopy.  The patient is going to be discussing some of these issues with the obstetrician later today, pain at the moment seems to be well-controlled, she will be straining all her urine, and we will monitor the situation very closely to see if we need to pursue any of the options discussed.  Did go over these issues carefully with the patient and her  today.  Answered all  "questions    Plan: We will have discussions with her obstetrician about the feasibility at least to doing a KUB and placing the patient on tamsulosin and then proceed based on the clinical progress of the patient.  We will could pursue a conservative approach at the present time.    \"This dictation was performed with voice recognition software and may contain errors,  omissions and inadvertent word substitution.\"      Again, thank you for allowing me to participate in the care of your patient.      Sincerely,    Reji Gannon MD      "

## 2018-11-29 NOTE — NURSING NOTE
Chief Complaint   Patient presents with     Flank Pain     Patient here today because of her stonehaving pain on her right side        UA RESULTS:  Recent Labs   Lab Test  11/29/18   1045  08/12/17   1659   COLOR  Yellow  Yellow   APPEARANCE  Clear  Clear   URINEGLC  Negative  Negative   URINEBILI  Small*  Negative   URINEKETONE  Trace*  Negative   SG  1.020  1.024   UBLD  Large*  Trace*   URINEPH  7.0  6.5   PROTEIN  30*  10*   UROBILINOGEN  1.0   --    NITRITE  Negative  Negative   LEUKEST  Small*  Negative   RBCU   --   1   WBCU   --   <1      Patient had a US and UA today as well as had taken some tylenol for pain

## 2018-11-30 ENCOUNTER — HOSPITAL ENCOUNTER (OUTPATIENT)
Dept: GENERAL RADIOLOGY | Facility: CLINIC | Age: 33
Discharge: HOME | End: 2018-11-30
Attending: UROLOGY | Admitting: UROLOGY
Payer: COMMERCIAL

## 2018-11-30 DIAGNOSIS — N20.0 CALCULUS OF KIDNEY: Primary | ICD-10-CM

## 2018-11-30 DIAGNOSIS — R10.9 RIGHT FLANK DISCOMFORT: ICD-10-CM

## 2018-11-30 DIAGNOSIS — R10.9 RIGHT FLANK DISCOMFORT: Primary | ICD-10-CM

## 2018-11-30 PROCEDURE — 74019 RADEX ABDOMEN 2 VIEWS: CPT

## 2018-11-30 RX ORDER — TAMSULOSIN HYDROCHLORIDE 0.4 MG/1
0.4 CAPSULE ORAL DAILY
Qty: 21 CAPSULE | Refills: 1 | Status: SHIPPED | OUTPATIENT
Start: 2018-11-30 | End: 2018-12-29

## 2018-11-30 NOTE — PROGRESS NOTES
"History: This is a great pleasure to see this very pleasant 33-year-old lady the first time today.  She is presenting with severe intermittent right-sided flank pain.  An ultrasound of the kidney had shown moderate hydronephrosis of the right kidney  She is  9 para 8.  She is 21 weeks pregnant at this time.  There is no apparent previous history of urinary stone disease.  No other imaging has been done because of her pregnancy.  She has had some intermittent nausea but currently is managing pain only with Tylenol.  Urinalysis shows large amount of blood in the urine but no evidence of urinary tract infection    Past Medical History:   Diagnosis Date     Atypical chest pain     ekg and workup normla in er     BMI 32.0-32.9,adult 2017     Dental abscess     seen in Er for denta lpain and had a periapical abscess given antibiotics, pain meds and a dental block     Former smoker      SOLANGE (generalized anxiety disorder) 6/15/2016     Hidradenitis suppurativa      History of herpes genitalis      LGSIL on Pap smear of cervix      Recurrent major depressive disorder, in partial remission (H)     on lexapro in past , then sertraline , later well butrin added        Past Surgical History:   Procedure Laterality Date     APPENDECTOMY       C/SECTION, LOW TRANSVERSE  10/31/ 2005    \"Elaina \"     COLPOSCOPY, BIOPSY, COMBINED  2005    + pap, bx 12 & 6 o clock,ECC: FAYE I     DILATION AND CURETTAGE SUCTION, TREAT INCOMPLETE   2009     VAG DELIV ONLY,PREV C-SECTN      \" Pedro\"       Family History   Problem Relation Age of Onset     Breast Cancer Maternal Grandmother      Diabetes Maternal Grandfather      KIDNEY DISEASE Paternal Grandmother      Skin Cancer No family hx of      Melanoma No family hx of      Hypertension No family hx of      Coronary Artery Disease No family hx of      Cerebrovascular Disease No family hx of      Other Cancer No family hx of        Social History "     Social History     Marital status: Single     Spouse name: N/A     Number of children: N/A     Years of education: N/A     Occupational History     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      Social History Main Topics     Smoking status: Former Smoker     Packs/day: 0.10     Years: 8.00     Types: Cigarettes     Quit date: 2011     Smokeless tobacco: Never Used     Alcohol use 3.0 oz/week     5 Standard drinks or equivalent per week     Drug use: No     Sexual activity: Yes     Partners: Male     Other Topics Concern     Not on file     Social History Narrative    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        Current Outpatient Prescriptions   Medication Sig Dispense Refill     Prenatal Vit-Fe Fumarate-FA (PRENATAL MULTIVITAMIN PLUS IRON) 27-0.8 MG TABS per tablet Take 1 tablet by mouth daily       sertraline (ZOLOFT) 100 MG tablet Take 1 tablet (100 mg) by mouth daily 90 tablet 3     ascorbic acid (VITAMIN C) 250 MG CHEW chewable tablet Take 1 tablet (250 mg) by mouth daily (Patient not taking: Reported on 2018) 90 tablet 0     aspirin 81 MG tablet Take 1 tablet (81 mg) by mouth daily (Patient not taking: Reported on 2018) 120 tablet 1     cholecalciferol (VITAMIN D3) 5000 UNITS CAPS capsule Take 1 capsule (5,000 Units) by mouth daily Take one capsule daily. (Patient not taking: Reported on 2018) 90 capsule 3     docusate sodium (COLACE) 100 MG tablet Take 100 mg by mouth daily (Patient not taking: Reported on 1/15/2018) 60 tablet 1     ferrous sulfate (IRON) 325 (65 FE) MG tablet Take 1 tablet (325 mg) by mouth daily (with breakfast) (Patient not taking: Reported on 1/15/2018) 60 tablet 2     loratadine (CLARITIN) 10 MG tablet Take 1 tablet (10 mg) by mouth daily 30 tablet 1     oseltamivir  "(TAMIFLU) 75 MG capsule Take 1 capsule (75 mg) by mouth daily (Patient not taking: Reported on 12/18/2017) 10 capsule 0       Review Of Systems:  Skin: There is a history of hidradenitis suppurativa  Eyes: negative  Ears/Nose/Throat: negative  Respiratory: No shortness of breath, dyspnea on exertion, cough, or hemoptysis  Cardiovascular: negative  Gastrointestinal: negative  Genitourinary: negative  Musculoskeletal: negative  Neurologic: negative  Psychiatric: negative and depression stable  Hematologic/Lymphatic/Immunologic: negative  Endocrine: negative    Exam:  /60 (BP Location: Left arm, Patient Position: Sitting, Cuff Size: Adult Large)  Pulse 98  Ht 1.651 m (5' 5\")  Wt 98 kg (216 lb)  SpO2 97%  BMI 35.94 kg/m2    General Impression: Very pleasant lady who is in mild distress at this time who is otherwise quite conversational and well oriented to time place and person.    Mental status.  Normal    HEENT: There is no clinical evidence of jaundice, mucous membranes are normal    Skin: Skin is otherwise normal at this time    Lymph Nodes: Not examined    Respiratory System: The respiratory cycle is normal    Cardiovascular: There is no significant peripheral pitting edema    Abdominal: Gravid uterus extending above the pubic bone    Extremities: Extremities otherwise unremarkable    Back and Flank: Significant increase in tenderness in right flank as compared to the left    Genital: Not examined    Rectal: Not examined    Neurologic: There are no focal abnormal clinical neurological signs in central, or peripheral nervous systems    Impression: The patient most likely has a stone in the right ureter with right hydronephrosis.  She is also 21 weeks pregnant.  This situation is always a challenging one to address.  We are very limited in imaging options at present time.  I will attempt to contact her obstetrician's about whether we would be able to at least do a KUB to see if there is any evidence of " "stone.  We will definitely need to rule out a CT scan at this time.  Clinical options would be firstly to hope that the stone will pass clinically and we can also have discussions with her obstetrician about whether we can use tamsulosin to help facilitate passage of the stone.  Other options include the placement of a stent to relieve pain but our experience usually is that placing stents in women during pregnancy is very challenging, very uncomfortable and the encrusted very quickly and at this stage the patient would likely require to have a stent exchanged to a possibly even 3 times before she comes to term.  A percutaneous nephrostomy tube could be placed to relieve pain and obstruction of the kidney but this would need to stay in place until she delivers for definitive management of the stone to place.  Finally, if the stone does not pass and we can do a KUB which identifies a stone in the ureter we can consider a ureteroscopy with holmium laser treatment with removal of fragments and then placement of a stent for only about a week before its removal in the office.  This type of surgery would be done very carefully with the fetus shielded from any potential fluoroscopic effects in the operating room and with minimal use of fluoroscopy.  The patient is going to be discussing some of these issues with the obstetrician later today, pain at the moment seems to be well-controlled, she will be straining all her urine, and we will monitor the situation very closely to see if we need to pursue any of the options discussed.  Did go over these issues carefully with the patient and her  today.  Answered all questions    Plan: We will have discussions with her obstetrician about the feasibility at least to doing a KUB and placing the patient on tamsulosin and then proceed based on the clinical progress of the patient.  We will could pursue a conservative approach at the present time.    \"This dictation was " "performed with voice recognition software and may contain errors,  omissions and inadvertent word substitution.\"    "

## 2018-12-29 ENCOUNTER — HOSPITAL ENCOUNTER (EMERGENCY)
Facility: CLINIC | Age: 33
Discharge: HOME | End: 2018-12-29
Attending: EMERGENCY MEDICINE | Admitting: EMERGENCY MEDICINE
Payer: COMMERCIAL

## 2018-12-29 ENCOUNTER — APPOINTMENT (OUTPATIENT)
Dept: ULTRASOUND IMAGING | Facility: CLINIC | Age: 33
End: 2018-12-29
Attending: EMERGENCY MEDICINE
Payer: COMMERCIAL

## 2018-12-29 VITALS
HEART RATE: 96 BPM | BODY MASS INDEX: 36.99 KG/M2 | SYSTOLIC BLOOD PRESSURE: 168 MMHG | TEMPERATURE: 98.6 F | RESPIRATION RATE: 16 BRPM | OXYGEN SATURATION: 97 % | DIASTOLIC BLOOD PRESSURE: 94 MMHG | WEIGHT: 222 LBS | HEIGHT: 65 IN

## 2018-12-29 DIAGNOSIS — N23 RENAL COLIC: ICD-10-CM

## 2018-12-29 LAB
ALBUMIN UR-MCNC: 30 MG/DL
AMORPH CRY #/AREA URNS HPF: ABNORMAL /HPF
APPEARANCE UR: ABNORMAL
BILIRUB UR QL STRIP: NEGATIVE
CAOX CRY #/AREA URNS HPF: ABNORMAL /HPF
COLOR UR AUTO: YELLOW
GLUCOSE UR STRIP-MCNC: NEGATIVE MG/DL
HGB UR QL STRIP: NEGATIVE
KETONES UR STRIP-MCNC: NEGATIVE MG/DL
LEUKOCYTE ESTERASE UR QL STRIP: ABNORMAL
MUCOUS THREADS #/AREA URNS LPF: PRESENT /LPF
NITRATE UR QL: NEGATIVE
PH UR STRIP: 6.5 PH (ref 5–7)
RBC #/AREA URNS AUTO: 2 /HPF (ref 0–2)
SOURCE: ABNORMAL
SP GR UR STRIP: 1.02 (ref 1–1.03)
SQUAMOUS #/AREA URNS AUTO: 3 /HPF (ref 0–1)
UROBILINOGEN UR STRIP-MCNC: NORMAL MG/DL (ref 0–2)
WBC #/AREA URNS AUTO: 4 /HPF (ref 0–5)

## 2018-12-29 PROCEDURE — 76770 US EXAM ABDO BACK WALL COMP: CPT

## 2018-12-29 PROCEDURE — 25000131 ZZH RX MED GY IP 250 OP 636 PS 637: Performed by: EMERGENCY MEDICINE

## 2018-12-29 PROCEDURE — 25000132 ZZH RX MED GY IP 250 OP 250 PS 637: Performed by: EMERGENCY MEDICINE

## 2018-12-29 PROCEDURE — 99284 EMERGENCY DEPT VISIT MOD MDM: CPT | Mod: 25

## 2018-12-29 PROCEDURE — 81001 URINALYSIS AUTO W/SCOPE: CPT | Performed by: EMERGENCY MEDICINE

## 2018-12-29 RX ORDER — OXYCODONE HYDROCHLORIDE 5 MG/1
5 TABLET ORAL ONCE
Status: COMPLETED | OUTPATIENT
Start: 2018-12-29 | End: 2018-12-29

## 2018-12-29 RX ORDER — TAMSULOSIN HYDROCHLORIDE 0.4 MG/1
0.4 CAPSULE ORAL AT BEDTIME
Qty: 10 CAPSULE | Refills: 0 | Status: SHIPPED | OUTPATIENT
Start: 2018-12-29 | End: 2019-01-08

## 2018-12-29 RX ORDER — PHENAZOPYRIDINE HYDROCHLORIDE 200 MG/1
200 TABLET, FILM COATED ORAL 3 TIMES DAILY
Qty: 6 TABLET | Refills: 0 | Status: SHIPPED | OUTPATIENT
Start: 2018-12-29 | End: 2018-12-31

## 2018-12-29 RX ORDER — ONDANSETRON 4 MG/1
4 TABLET, ORALLY DISINTEGRATING ORAL EVERY 8 HOURS PRN
Qty: 10 TABLET | Refills: 0 | Status: SHIPPED | OUTPATIENT
Start: 2018-12-29 | End: 2019-01-01

## 2018-12-29 RX ORDER — ONDANSETRON 4 MG/1
4 TABLET, ORALLY DISINTEGRATING ORAL ONCE
Status: COMPLETED | OUTPATIENT
Start: 2018-12-29 | End: 2018-12-29

## 2018-12-29 RX ADMIN — OXYCODONE HYDROCHLORIDE 5 MG: 5 TABLET ORAL at 03:05

## 2018-12-29 RX ADMIN — ONDANSETRON 4 MG: 4 TABLET, ORALLY DISINTEGRATING ORAL at 03:05

## 2018-12-29 ASSESSMENT — ENCOUNTER SYMPTOMS
HEMATURIA: 0
VOMITING: 1
FLANK PAIN: 1
DYSURIA: 1
FEVER: 0

## 2018-12-29 NOTE — ED PROVIDER NOTES
"  History     Chief Complaint:  Flank pain    HPI   Vincent Burris is a 33 year old female who is 25 weeks pregnant and presents with flank pain. The patient had pain start in November and has seen a urologist across the street from [facility] for kidney stone. She noticed the pain originally in her right flank but over the past couple weeks it has started in her left as well. Tonight, she could not sleep due to pain thus she has present to the ED for evaluation and treatment. Upon arrival, the paitent reports vomiting and dysuria. She denies any hematuria or fever. Of note: she did take 2 tylenol 5 hours ago.     Allergies:  zithromax    Medications:    colace  claritin  tamiflu  zoloft  flomax    Past Medical History:    atypical chest pain   Dental abscess   Former smoker  Anxiety  Hidradenitis suppurativa  Herpes  LGSIL  depression    Past Surgical History:    Appendectomy  c section  Colposcopy  Dilation and curettage  Vaginal delivery    Family History:    No past pertinent family history.    Social History:  Marital Status:  Unknown   Smoker:   Former 2011   Smokeless:   Never   Alcohol:   Yes 5 drinks a week   Drugs:   No   Lives at:   Unknown   Arrives with:   Unknown relationship   Clinic:    Unknown   Work:   Unknown     Review of Systems   Constitutional: Negative for fever.   Gastrointestinal: Positive for vomiting.   Genitourinary: Positive for dysuria and flank pain. Negative for hematuria.   All other systems reviewed and are negative.    Physical Exam     Patient Vitals for the past 24 hrs:   BP Temp Temp src Pulse Heart Rate Resp SpO2 Height Weight   12/29/18 0421 (!) 168/94 -- -- -- 99 16 97 % -- --   12/29/18 0236 140/82 -- -- 96 -- 18 97 % -- --   12/29/18 0235 140/82 98.6  F (37  C) Oral 96 -- 18 97 % 1.651 m (5' 5\") --   12/29/18 0217 -- -- -- -- -- -- -- -- 100.7 kg (222 lb)     Physical Exam  General: Appears well-developed and well-nourished.   Head: No signs of " trauma.   CV: Normal rate and regular rhythm.    Resp: Effort normal and breath sounds normal. No respiratory distress.   GI: Gravid uterus. There is no tenderness.  No rebound or guarding.  Normal bowel sounds.  No CVA tenderness.  MSK: Normal range of motion.   Neuro: The patient is alert and oriented.  Speech normal.  GCS 15  Skin: Skin is warm and dry. No rash noted.   Psych: normal mood and affect. behavior is normal.       Emergency Department Course   Imaging:  Radiographic findings were communicated with the patient and family who voiced understanding of the findings.    US retroperitoneal:  1. Mild left hydronephrosis.  2. Solitary 0.5 cm right renal stone. No right hydronephrosis. as per radiology.     Laboratory:  UA with Microscopic: albumin 30, leukocyte esterase small, squamous epithelial 3, mucous present, calcium oxalate few, amorphous crystals few, o/w WNL    Interventions:  0305: Zofran 4 mg IV  0305: Roxicodone 5 mg PO    Emergency Department Course:  Nursing notes and vitals reviewed. (0256) I performed an exam of the patient as documented above.     IV inserted. Medicine administered as documented above. Blood drawn. This was sent to the lab for further testing, results above.    The patient was sent for a US retroperitoneal while in the emergency department, findings above.     (0350) I rechecked the patient and discussed the results of her workup thus far.     Findings and plan explained to the Patient and partner. Patient discharged home with instructions regarding supportive care, medications, and reasons to return. The importance of close follow-up was reviewed. The patient was prescribed zofran, pyridium, and flomax.    I personally reviewed the laboratory results with the Patient and partner and answered all related questions prior to discharge.    Impression & Plan    Medical Decision Making:  Vincent Burris is a 33-year-old woman who is pregnant and presents with left flank pain.   She has been dealing with the same left flank pain related to kidney stones over the last few weeks.  She has seen urology and given the continuation of the discomfort, she came to the ER.  I did obtain a UA which did not show any signs of infection.  It did show signs of oxalate crystals which would be consistent with passage of kidney stones.  Also obtained an ultrasound which showed hydronephrosis on the left which is the location of the patient's symptoms again consistent with a renal colic.  I did discuss with the patient that, unfortunately, interventions with her being pregnant are limited.  She was given a refill of Zofran and Pyridium and Flomax as she has felt that these have been helpful before. She is recommended to continue with pain medication that she had at home.  Also recommended that she follow-up with urology to continue to discuss any further interventions, the patient states that she has spoken to them already and is not particularly interested in interventions while she is pregnant.  I also recommended follow up with her OB.  Given the history and workup, I have a low suspicion for this being related to the pregnancy itself.  She is recommended to return to the ER for any worsening symptoms, fevers, or any other concerns.    Diagnosis:    ICD-10-CM    1. Renal colic N23      Disposition:  discharged to home with zofran, pyridium, and flomax.    Discharge Medications:     Medication List      Started    ondansetron 4 MG ODT tab  Commonly known as:  ZOFRAN ODT  4 mg, Oral, EVERY 8 HOURS PRN     phenazopyridine 200 MG tablet  Commonly known as:  PYRIDIUM  200 mg, Oral, 3 TIMES DAILY        Modified    tamsulosin 0.4 MG capsule  Commonly known as:  FLOMAX  0.4 mg, Oral, AT BEDTIME  What changed:  when to take this          Scribe Disclosure:  I, Bijan Miles, am serving as a scribe on 12/29/2018 at 2:56 AM to personally document services performed by Reji Menchaca MD based on my observations  and the provider's statements to me.     Bijan Miles  12/29/2018    EMERGENCY DEPARTMENT       Reji Menchaca MD  12/31/18 0107

## 2018-12-29 NOTE — ED AVS SNAPSHOT
Emergency Department  [address] [facility] [address]  Phone:  [phone]  Fax:  [phone]                                    Vincent Burris   MRN: [id number]    Department:   Emergency Department   Date of Visit:  12/29/2018           After Visit Summary Signature Page    I have received my discharge instructions, and my questions have been answered. I have discussed any challenges I see with this plan with the nurse or doctor.    ..........................................................................................................................................  Patient/Patient Representative Signature      ..........................................................................................................................................  Patient Representative Print Name and Relationship to Patient    ..................................................               ................................................  Date                                   Time    ..........................................................................................................................................  Reviewed by Signature/Title    ...................................................              ..............................................  Date                                               Time          22EPIC Rev 08/18

## 2019-02-15 ENCOUNTER — HEALTH MAINTENANCE LETTER (OUTPATIENT)
Age: 34
End: 2019-02-15

## 2019-09-17 NOTE — LETTER
[facility]  [address] [address]  [phone]      September 5, 2017    RE:  Vincent Burris                                                                                                                                                       [address] [place] [id number]            To whom it may concern:    Vincent Burris was seen in the urgent care today.  She is being treated for a sinus infection.        Sincerely,        Gilmar Wolf [facility]          
0.5

## 2019-11-07 ENCOUNTER — HEALTH MAINTENANCE LETTER (OUTPATIENT)
Age: 34
End: 2019-11-07

## 2020-02-17 ENCOUNTER — HEALTH MAINTENANCE LETTER (OUTPATIENT)
Age: 35
End: 2020-02-17

## 2020-03-11 NOTE — PROGRESS NOTES
Subjective:      32 year old  at 20w5d presents for a routine prenatal appointment.       no vaginal bleeding or leakage of fluid.  occ  contractions or cramping.    good  fetal movement.       No HA, visual changes, RUQ or epigastric pain.   The patient presents with the following concerns:   Level II US  Results reviewed normal scan.  Repeat growth for marginal cord insertion     Objective:  There were no vitals filed for this visit.  See OB flowsheet    Assessment/Plan     Encounter Diagnosis   Name Primary?     Supervision of other normal pregnancy, antepartum Yes   disc limiting weight gain.   - Reviewed total weight gain, encouraged continued healthy diet and exercise.      - Reviewed why/how to contact provider.    Patient education/orders or handouts today:  PTL signs/symptoms   Return to clinic in 4 weeks and prn if questions or concerns.   MART Ge CNM                 sore throat

## 2020-11-29 ENCOUNTER — HEALTH MAINTENANCE LETTER (OUTPATIENT)
Age: 35
End: 2020-11-29

## 2021-09-25 ENCOUNTER — HEALTH MAINTENANCE LETTER (OUTPATIENT)
Age: 36
End: 2021-09-25

## 2022-01-15 ENCOUNTER — HEALTH MAINTENANCE LETTER (OUTPATIENT)
Age: 37
End: 2022-01-15

## 2022-12-21 NOTE — NURSING NOTE
-- DO NOT REPLY / DO NOT REPLY ALL --  -- Message is from Engagement Center Operations (ECO) --    General Patient Message: Patient calling back in regards to being seen by Dr. Olivia Kendrick.  (No openings to schedule)  Please contact the patient.      Caller Information       Type Contact Phone/Fax    12/21/2022 08:26 AM CST Phone (Incoming) Brown-Reyes, Narthasia F (Self) [phone] (M)        Alternative phone number: None    Can a detailed message be left? Yes    Message Turnaround:     Is it Working Hours? Yes - Working Hours     [place]:    Please give this turnaround time to the caller:   \"This message will be sent to [state Provider's name]. The clinical team will fulfill your request as soon as they review your message.\"                 Chief Complaint   Patient presents with     Prenatal Care     Pt is 20.5 week

## 2022-12-26 ENCOUNTER — HEALTH MAINTENANCE LETTER (OUTPATIENT)
Age: 37
End: 2022-12-26

## 2023-04-16 ENCOUNTER — HEALTH MAINTENANCE LETTER (OUTPATIENT)
Age: 38
End: 2023-04-16

## 2023-12-20 NOTE — MR AVS SNAPSHOT
After Visit Summary   8/28/2017    Vincent Burris    MRN: [id number]           Patient Information     Date Of Birth          1985        Visit Information        Provider Department      8/28/2017 8:00 AM [facility] ULTRASOUND [facility]        Today's Diagnoses     Normal pregnancy in first trimester           Follow-ups after your visit        Your next 10 appointments already scheduled     Sep 12, 2017  9:00 AM CDT   NEW OB with MART Alexander CNM   [facility] ([facility])    [facility]  [address]  [address]  [facility] [address] [phone]              Future tests that were ordered for you today     Open Future Orders        Priority Expected Expires Ordered    [facility] Comprehensive Single Routine  6/28/2018 8/28/2017            Who to contact     Please call your clinic at [phone] to:    Ask questions about your health    Make or cancel appointments    Discuss your medicines    Learn about your test results    Speak to your doctor   If you have compliments or concerns about an experience at your clinic, or if you wish to file a complaint, please contact [facility] Patient Relations at [phone] or email us at [email or web address]         Additional Information About Your Visit        MyChart Information     [employer] gives you secure access to your electronic health record. If you see a primary care provider, you can also send messages to your care team and make appointments. If you have questions, please call your primary care clinic.  If you do not have a primary care provider, please call [phone] and they will assist you.      [employer] is an electronic gateway that provides easy, online access to your medical records. With [employer], you can request a clinic appointment, read your test results, renew a prescription or communicate with your care  Kidney function is looking better on labs yesterday.    She's doing a great mariaa with hydration!    I would add pepcid 20mg twice daily as needed for sour stomach.           "team.     To access your existing account, please contact your [facility] or call [phone] for assistance.        Care EveryWhere ID     This is your Care EveryWhere ID. This could be used by other organizations to access your [facility] medical records  [id number]        Your Vitals Were     Pulse Height Last Period Breastfeeding? BMI (Body Mass Index)       75 1.676 m (5' 5.98\") 06/28/2017 (Exact Date) No 31.99 kg/m2        Blood Pressure from Last 3 Encounters:   08/28/17 114/76   08/12/17 114/76   07/27/17 104/64    Weight from Last 3 Encounters:   08/28/17 89.9 kg (198 lb 1.6 oz)   08/12/17 88 kg (194 lb)   07/27/17 87.8 kg (193 lb 8 oz)              We Performed the Following     Dating ultrasound less than 14 weeks gestation Transvag  - 71754 (In Clinic)          Today's Medication Changes          These changes are accurate as of: 8/28/17 11:59 PM.  If you have any questions, ask your nurse or doctor.               Start taking these medicines.        Dose/Directions    doxylamine 25 MG Tabs tablet   Commonly known as:  UNISOM   Used for:  Nausea   Started by:  Steff Hernandez APRN CNM        Dose:  25 mg   Take 1 tablet (25 mg) by mouth At Bedtime   Quantity:  30 each   Refills:  1       pyridOXINE 25 MG tablet   Commonly known as:  vitamin B-6   Used for:  Nausea   Started by:  Steff Hernandez APRN CNM        Dose:  25 mg   Take 1 tablet (25 mg) by mouth 3 times daily   Quantity:  60 tablet   Refills:  1         Stop taking these medicines if you haven't already. Please contact your care team if you have questions.     buPROPion 150 MG 24 hr tablet   Commonly known as:  WELLBUTRIN XL                Where to get your medicines      These medications were sent to [facility] - [address]  [address] [place] [address]     Phone:  [phone]     doxylamine 25 MG Tabs tablet    pyridOXINE 25 MG tablet                " Primary Care Provider Office Phone # Fax #    Xiomara Mariela Smith [phone]       [address] [place] [address]  [facility] [address]        Equal Access to Services     JASVIR HOLLOWAY : Hadii aad ku hadbirgito Soned, waaxda luqadaha, qaybta kaalmada adeegyada, marichuy stevenson yarelyjose david ag suleiman adkins. So [facility] [phone].    ATENCIÓN: Si habla español, tiene a alston disposición servicios gratuitos de asistencia lingüística. Llame al [phone].    We comply with applicable federal civil rights laws and [place] laws. We do not discriminate on the basis of race, color, national origin, age, disability sex, sexual orientation or gender identity.            Thank you!     Thank you for choosing [facility]  for your care. Our goal is always to provide you with excellent care. Hearing back from our patients is one way we can continue to improve our services. Please take a few minutes to complete the written survey that you may receive in the mail after your visit with us. Thank you!             Your Updated Medication List - Protect others around you: Learn how to safely use, store and throw away your medicines at www.disposemymeds.org.          This list is accurate as of: 8/28/17 11:59 PM.  Always use your most recent med list.                   Brand Name Dispense Instructions for use Diagnosis    doxylamine 25 MG Tabs tablet    UNISOM    30 each    Take 1 tablet (25 mg) by mouth At Bedtime    Nausea       fluticasone 50 MCG/ACT spray    FLONASE    1 Bottle    Spray 1 spray into both nostrils daily    Seasonal allergic rhinitis       loratadine 10 MG ODT tab    CLARITIN REDITABS     Take 10 mg by mouth daily        prenatal multivitamin plus iron 27-0.8 MG Tabs per tablet      Take 1 tablet by mouth daily        pyridOXINE 25 MG tablet    vitamin B-6    60 tablet    Take 1 tablet (25 mg) by mouth 3 times daily    Nausea       sertraline 100 MG tablet    ZOLOFT    90  tablet    Take 1 tablet (100 mg) by mouth daily    SOLANGE (generalized anxiety disorder)